# Patient Record
Sex: FEMALE
[De-identification: names, ages, dates, MRNs, and addresses within clinical notes are randomized per-mention and may not be internally consistent; named-entity substitution may affect disease eponyms.]

---

## 2019-04-05 PROBLEM — Z00.00 ENCOUNTER FOR PREVENTIVE HEALTH EXAMINATION: Status: ACTIVE | Noted: 2019-04-05

## 2019-04-09 ENCOUNTER — APPOINTMENT (OUTPATIENT)
Age: 44
End: 2019-04-09
Payer: COMMERCIAL

## 2019-04-09 DIAGNOSIS — C50.919 MALIGNANT NEOPLASM OF UNSPECIFIED SITE OF UNSPECIFIED FEMALE BREAST: ICD-10-CM

## 2019-04-09 PROCEDURE — 99204 OFFICE O/P NEW MOD 45 MIN: CPT

## 2019-04-09 PROCEDURE — 73502 X-RAY EXAM HIP UNI 2-3 VIEWS: CPT | Mod: LT

## 2019-04-18 NOTE — PHYSICAL EXAM
[FreeTextEntry1] : Physical exam reveals a fully alert oriented a patient complaining about severe left hip pain difficulty standing and walking and drainage of the left hip demonstrates limited range of motion over the left hip new x-ray and CAT scan demonstrate a destructive pathological fracture of the left acetabulum with fragmentation of the femoral head suggesting a possible status post metastatic breast with radiation-induced changes. At this stage and this condition was discussed with the patient the need for possible major surgical procedure including left total hip replacement was discussed with the patient at this stage patient would like to think about the underlying condition and we let us know about her decision with respect to treatment

## 2019-04-18 NOTE — HISTORY OF PRESENT ILLNESS
[FreeTextEntry1] : This is the first visit over 43 years old female with a previous history of  breast carcinoma presented with severe pain tenderness over the left hip related to metastatic carcinoma. 12 years ago patient was diagnosed with carcinoma or breast at that time underwent lumpectomy post radiation chemotherapy and several years later patient was found to have a metastatic lesion to the liter underwent a partial resection of the liter and the second time patient was found to have a progressive metastatic bone disease recently patient underwent radiation therapy to the left hip where x-rays demonstrated destructive lytic process involving the left acetabulum. At this stage patient having severe pain tenderness over the left hip difficulty standing and walking.

## 2019-05-13 ENCOUNTER — APPOINTMENT (OUTPATIENT)
Dept: LAB | Facility: HOSPITAL | Age: 44
End: 2019-05-13
Attending: ORTHOPAEDIC SURGERY
Payer: COMMERCIAL

## 2019-05-13 ENCOUNTER — OFFICE VISIT (OUTPATIENT)
Dept: PREADMISSION TESTING | Facility: HOSPITAL | Age: 44
End: 2019-05-13
Attending: ORTHOPAEDIC SURGERY
Payer: COMMERCIAL

## 2019-05-13 ENCOUNTER — TRANSCRIBE ORDERS (OUTPATIENT)
Dept: PREADMISSION TESTING | Facility: HOSPITAL | Age: 44
End: 2019-05-13

## 2019-05-13 VITALS
OXYGEN SATURATION: 97 % | HEART RATE: 82 BPM | HEIGHT: 65 IN | BODY MASS INDEX: 32.65 KG/M2 | TEMPERATURE: 98.1 F | DIASTOLIC BLOOD PRESSURE: 69 MMHG | SYSTOLIC BLOOD PRESSURE: 120 MMHG | RESPIRATION RATE: 16 BRPM | WEIGHT: 196 LBS

## 2019-05-13 DIAGNOSIS — M16.12 PRIMARY OSTEOARTHRITIS OF LEFT HIP: ICD-10-CM

## 2019-05-13 DIAGNOSIS — Z01.818 ENCOUNTER FOR PREADMISSION TESTING: Primary | ICD-10-CM

## 2019-05-13 DIAGNOSIS — C50.912 BREAST CANCER METASTASIZED TO BONE, LEFT (CMS/HCC): ICD-10-CM

## 2019-05-13 DIAGNOSIS — M16.12 PRIMARY OSTEOARTHRITIS OF LEFT HIP: Primary | ICD-10-CM

## 2019-05-13 DIAGNOSIS — C79.51 BREAST CANCER METASTASIZED TO BONE, LEFT (CMS/HCC): ICD-10-CM

## 2019-05-13 DIAGNOSIS — M16.12 PRIMARY LOCALIZED OSTEOARTHRITIS OF LEFT HIP: ICD-10-CM

## 2019-05-13 LAB
ABO + RH BLD: NORMAL
ANION GAP SERPL CALC-SCNC: 11 MEQ/L (ref 3–15)
ATRIAL RATE: 81
BLD GP AB SCN SERPL QL: NEGATIVE
BLOOD BANK CMNT PATIENT-IMP: NORMAL
BUN SERPL-MCNC: 8 MG/DL (ref 8–20)
CALCIUM SERPL-MCNC: 9.6 MG/DL (ref 8.9–10.3)
CHLORIDE SERPL-SCNC: 105 MEQ/L (ref 98–109)
CO2 SERPL-SCNC: 25 MEQ/L (ref 22–32)
CREAT SERPL-MCNC: 0.4 MG/DL
D AG BLD QL: NEGATIVE
ERYTHROCYTE [DISTWIDTH] IN BLOOD BY AUTOMATED COUNT: 18 % (ref 11.7–14.4)
GFR SERPL CREATININE-BSD FRML MDRD: >60 ML/MIN/1.73M*2
GLUCOSE SERPL-MCNC: 98 MG/DL (ref 70–99)
HCG SERPL-ACNC: 1.2 MIU/L
HCT VFR BLDCO AUTO: 35.2 %
HGB BLD-MCNC: 11 G/DL
LABORATORY COMMENT REPORT: NORMAL
MCH RBC QN AUTO: 24.6 PG (ref 28–33.2)
MCHC RBC AUTO-ENTMCNC: 31.3 G/DL (ref 32.2–35.5)
MCV RBC AUTO: 78.7 FL (ref 83–98)
P AXIS: 51
PDW BLD AUTO: 10.5 FL (ref 9.4–12.3)
PLATELET # BLD AUTO: 206 K/UL
POTASSIUM SERPL-SCNC: 3.7 MEQ/L (ref 3.6–5.1)
PR INTERVAL: 138
QRS DURATION: 84
QT INTERVAL: 392
QTC CALCULATION(BAZETT): 455
R AXIS: 31
RBC # BLD AUTO: 4.47 M/UL (ref 3.93–5.22)
SODIUM SERPL-SCNC: 141 MEQ/L (ref 136–144)
T WAVE AXIS: 22
VENTRICULAR RATE: 81
WBC # BLD AUTO: 7.97 K/UL

## 2019-05-13 PROCEDURE — 36415 COLL VENOUS BLD VENIPUNCTURE: CPT

## 2019-05-13 PROCEDURE — 84702 CHORIONIC GONADOTROPIN TEST: CPT

## 2019-05-13 PROCEDURE — 86850 RBC ANTIBODY SCREEN: CPT

## 2019-05-13 PROCEDURE — 93010 ELECTROCARDIOGRAM REPORT: CPT | Performed by: INTERNAL MEDICINE

## 2019-05-13 PROCEDURE — 99203 OFFICE O/P NEW LOW 30 MIN: CPT | Performed by: HOSPITALIST

## 2019-05-13 PROCEDURE — 85027 COMPLETE CBC AUTOMATED: CPT

## 2019-05-13 PROCEDURE — 80048 BASIC METABOLIC PNL TOTAL CA: CPT

## 2019-05-13 PROCEDURE — 93005 ELECTROCARDIOGRAM TRACING: CPT

## 2019-05-13 PROCEDURE — 86920 COMPATIBILITY TEST SPIN: CPT

## 2019-05-13 RX ORDER — IBUPROFEN 200 MG
200 TABLET ORAL EVERY 6 HOURS PRN
COMMUNITY

## 2019-05-13 RX ORDER — OXYCODONE HYDROCHLORIDE 5 MG/1
5 TABLET ORAL EVERY 8 HOURS PRN
Refills: 0 | Status: ON HOLD | COMMUNITY
Start: 2019-04-30 | End: 2019-05-31

## 2019-05-13 RX ORDER — ONDANSETRON HYDROCHLORIDE 8 MG/1
TABLET, FILM COATED ORAL
Refills: 5 | COMMUNITY
Start: 2019-05-07

## 2019-05-13 RX ORDER — GABAPENTIN 300 MG/1
CAPSULE ORAL
Refills: 0 | COMMUNITY
Start: 2019-05-07

## 2019-05-13 RX ORDER — EXEMESTANE 25 MG/1
TABLET ORAL
Refills: 5 | COMMUNITY
Start: 2019-05-02

## 2019-05-13 NOTE — ASSESSMENT & PLAN NOTE
Metastatic disease to the left bony pelvis with soft tissue extension, and left thigh. She has undergone XRT with Dr. Craven, last 11/18, and has had ongoing severe pain, a component of which is due to the left hip disease. She continues on treatment with monthly Lupron and Zometa, as well as Aromasin and Kadcyla, follows very closely with her oncologist, Dr. Cortez, and tells me he is aware of upcoming surgery. Naturally, I defer to him on all matters of her medical oncology care.

## 2019-05-13 NOTE — ASSESSMENT & PLAN NOTE
Patient with severe left hip pain stemming from end stage OA of the hip with marked collapse and bone loss of the left femoral head. She will proceed with hip replacement with Dr. Ugarte. She is currently taking oxycodone for pain which she may continue. She was advised to stop her motrin 1 week prior to surgery.

## 2019-05-13 NOTE — CONSULTS
[facility] Medicine Service -  Pre-Operative Consultation       Patient Name: Tatum Dhaliwal  Referring Surgeon:  Skip Ugarte MD   Reason for Referral: Pre-Operative Evaluation  Surgical Procedure: Left Total Hip Arthroplasty with Gap Ring and Constraint Cup  Operative Date: 5/28/19  Other Providers:      PCP: Manish Blount, No Pcp   Oncology: Ludwig Cortez MD   Radiation Oncology: Tessy Craven MD  PMR, Oncologic Rehab: Ham Sparks DO     HISTORY OF PRESENT ILLNESS      Tatum Dhaliwal is a 43 y.o. female presenting today to the [facility] at [facility] for pre-operative evaluation prior to planned surgery.  Patient with metastatic breast cancer reports severe progressive left hip pain. She notes 10/10 sharp stabbing pain in the left groin and focal low buttock. Pain is exacerbated by walking, standing, sitting for long periods, going up and down stairs, inclines, any pivoting movements, putting shoes/socks on, getting up out of low chair, and changing positions in bed. She tells me she is miserable with severe pain and finds few positions comfortable. She is taking oxycodone and motrin for pain.  Patient reported left hip and pelvis pain beginning 11/17 at which time the metastases in the left bony pelvis and soft tissue, and well as left thigh which was discovered. She has undergone RT with Dr. Craven, last 12/18. She has had several months of progressive pain prompting evaluation by several physicians, including MRI findings as described below, and it is thought that end stage hip OA is contributing to her pain. She sought the opinion of Dr. Ugarte and they are planning on proceeding with joint replacement.     Patient's Left Breast Cancer was initially diagnosed in 2007, DCIS with microscopic invasion. She underwent partial mastectomy and local RT, but had local recurrence in 2008, underwent mastectomy, reconstruction, RT, and took 1 year  of adjuvant tamoxifen which she stopped due to pregnancy. She then had local chest wall recurrence in 2013 and underwent RT and chemotherapy with taxotere/cytoxan x 4, weekly taxol, Herceptin and Pertuzumab every 3 weeks, monthly Lupron and Faslodex, and then started gemcitabine in '16. She was found to have isolated liver metastasis and underwent resection 1/17. She developed left pelvic pain in 11/17 and was subsequently found to have metastasis to the left inferior pelvic bone with soft tissue extension. She has had RT to this area, last 11/18 she tells me. She has continued on maintenance treatment, switching to Kadcyla 1/18 due to disease progression.      Patient denies any prior cardiac history. She has had several MUGA scans, and has been advised all were normal. She tells me she still ambulates around her home, up and down stairs, helps care for household and kids, without chest pain, chest pressure or shortness of breath. She denies history of pulmonary issues including prior pneumonia, asthma, wheezing. She denies prior knowledge of difficulty with kidneys/liver (other than resected met), and tells me she has never had difficulties with mentation, negative reactions to pain medications, surrounding surgeries. She denies history of DVT, VTE. She denies easy bruising/bleeding.     She does have persistent hypokalemia and tells me she has tried to increase the potassium in her diet.     She tells me she has iron deficiency and gets iron infusions.    The patient denies any current or recent chest pain or pressure, dyspnea, cough, sputum, fevers, chills, abdominal pain, nausea, vomiting, diarrhea or other symptoms.     Functionally, the patient is able to ascend a flight or so of stairs with no dyspnea or chest pain.     The patient denies, on specific questioning, the following:  No history of MI, arrhythmia,or CHF.  No history of NIKKO.  No history of DVT/PE.  No history of COPD.  No history of CVA.  No  history of DM.   No history of CKD.     PAST MEDICAL AND SURGICAL HISTORY      Past Medical History:   Diagnosis Date   • Anemia     has had iron infusions   • Breast cancer (CMS/HCC) (HCC) 2007    left breast xrt, 2008, 2017,2018/ chemo 2013, 2015, and currently   • Nausea     occas-zofran prn   • Neuropathy     hands and feet secondary to chemo       Past Surgical History:   Procedure Laterality Date   • BREAST LUMPECTOMY Left 2007   • LIVER RESECTION  2016    for mets?   • MASTECTOMY Left 2008   • PORTACATH PLACEMENT Right        MEDICATIONS        Current Outpatient Prescriptions:   •  ibuprofen (MOTRIN) 200 mg tablet, Take 200 mg by mouth every 6 (six) hours as needed for mild pain., Disp: , Rfl:   •  leuprolide acetate (LUPRON DEPOT IM), Inject into the shoulder, thigh, or buttocks every 30 (thirty) days., Disp: , Rfl:   •  exemestane (AROMASIN) 25 mg chemo tablet, TK 1 T PO D, Disp: , Rfl: 5  •  gabapentin (NEURONTIN) 300 mg capsule, TK 1 C PO BID, Disp: , Rfl: 0  •  ondansetron (ZOFRAN) 8 mg tablet, TK 1 T PO Q 8 H PRF NAUSEA, Disp: , Rfl: 5  •  oxyCODONE (ROXICODONE) 5 mg immediate release tablet, 5 mg every 8 (eight) hours as needed.  , Disp: , Rfl: 0  •  sodium chloride 0.9 % parenteral solution 250 mL with ado-trastuzumab 100 mg recon soln, Infuse into a venous catheter every 21 (twentyone) days.  , Disp: , Rfl:     ALLERGIES      Morphine and Amoxicillin    FAMILY HISTORY      family history is not on file.    Denies any prior known family history of DVTs/PEs/clotting disorder    SOCIAL HISTORY      Social History   Substance Use Topics   • Smoking status: Never Smoker   • Smokeless tobacco: Never Used   • Alcohol use No     Lives with  and children age 10-22.   2 story home, bathroom on both floors  No pets    REVIEW OF SYSTEMS      Constitutional: Denies Fever, Chills, Fatigue, Malaise, Unintentional Weight loss  HEENT: Denies Vision changes, Epistaxis, Trouble Swallowing, Sore  "Throat  Respiratory: Denies Cough, Shortness of Breath, Wheezing  Cardiovascular: Denies Chest Pain, Exertional Dyspnea, Palpitations, Edema  GI: Denies Abdominal pain, Nausea, Vomiting, Changes in bowel movements, Blood in stool   : Denies Dysuria, Hematuria  Musculoskeletal: Denies Back pain, Neck pain, +++ left anterior hip/groin and buttock pain.   Skin: Denies rash  Neuro: Denies Headache, Syncope  Heme: Denies Bleeding      PHYSICAL EXAMINATION      /69 (BP Location: Right upper arm, Patient Position: Sitting)   Pulse 82   Temp 36.7 °C (98.1 °F)   Resp 16   Ht 1.651 m (5' 5\")   Wt 88.9 kg (196 lb)   SpO2 97%   BMI 32.62 kg/m²   Body mass index is 32.62 kg/m².    Physical Exam   Constitutional: Well developed, Well Nourished, No apparent distress, Appears Comfortable  HEENT: NCAT, oropharynx clear  Neck: supple, no LAD  Cardiovascular: Normal rate, Regular Rhythm, Normal heart sounds, No murmur noted, No carotid bruits  Pulmonary: Normal effort without distress, Normal breath sounds without wheezing, rhonchi, or rales  Abdomen: Soft, no distension, nontender, normal bowel sounds  Extremities: No edema  Neurologic: No gross abnormalities, ambulating with cane/wheelchair for distances.   Skin: Warm, No rash  Psychiatric: Normal mood and affect      LABS / EKG        Labs  Lab Results   Component Value Date     05/13/2019    K 3.7 05/13/2019     05/13/2019    BUN 8 05/13/2019    CREATININE 0.4 (L) 05/13/2019    WBC 7.97 05/13/2019    HGB 11.0 (L) 05/13/2019    HCT 35.2 05/13/2019     05/13/2019   ;    ECG/Telemetry  Normal Sinus Rhythm, Nonspecific TW abnormalities.     MRI Pelvis 4/24/19   Findings compatible with treated metastasis of the left iliac bone/acetabular region with osseous destruction and comminuted and displaced pathologic fracture involving the posterior, superior and anterior acetabular regions with intraarticular extension as described. Bone tumor recurrence " cannot be excluded. End-stage left hip joint osteoarthritis with marked collapse and bone loss of the left femoral head. Moderate left hip joint effusion with synovitis. Edematous change enhancement of the musculature of the left hemipelvis most likely secondary to radiation therapy and pathologic fracture Increased signal of the left sciatic nerve most likely due to radiation therapy.    MUGA scan 4/17 noted EF 55%; I do see she had a MUGA 4/19/18, results not available to me, but patient reports being told it was normal.        ASSESSMENT AND PLAN         Preop examination  Medical management and peggy-operative risk commentary as noted below.      Primary osteoarthritis of left hip  Patient with severe left hip pain stemming from end stage OA of the hip with marked collapse and bone loss of the left femoral head. She will proceed with hip replacement with Dr. Ugarte. She is currently taking oxycodone for pain which she may continue. She was advised to stop her motrin 1 week prior to surgery.     Breast cancer metastasized to bone, left (CMS/HCC) (HCC)  Metastatic disease to the left bony pelvis with soft tissue extension, and left thigh. She has undergone XRT with Dr. Craven, last 11/18, and has had ongoing severe pain, a component of which is due to the left hip disease. She continues on treatment with monthly Lupron and Zometa, as well as Aromasin and Kadcyla, follows very closely with her oncologist, Dr. Cortez, and tells me he is aware of upcoming surgery. Naturally, I defer to him on all matters of her medical oncology care.        In regards to perioperative cardiac risk:  The patient denies any history of ischemic heart disease, denies any history of CHF, denies any history of CVA, is not on pre-operative treatment with insulin, and does not have a pre-operative creatinine > 2 mg/dL.   The Revised Cardiac Risk Index (RCRI) for this patient indicates 0.4% risk.     Further comments:  Resume supplements when  OK with surgical team.  I would encourage incentive spirometry to assist with minimizing peggy-operative pulmonary risk.  Patient is at increased risk for VTE and prophylaxis is recommended. Prophylaxis  choice and timing of such per the discretion of Dr. Ugarte.     Please do not hesitate to contact [facility] during the upcoming hospitalization with any questions or concerns.     Jesusita Valadez MD  5/13/2019

## 2019-05-13 NOTE — PRE-PROCEDURE INSTRUCTIONS
1. We will call you between 3 pm and 7 pm on May 24, 2019 to determine that arrival time for your procedure. If you do not hear by 4:30 PM. Please call [phone] for arrival time.    2. Please report to [address] [place] [address] [place] [address] [place], walk into [employer] and report to the admission desk on first floor on the day of your procedure.   3. Please follow the following fasting guidelines:   Nothing to eat or drink after midnight unless otherwise instructed by  your physician. No gum mints candy.Brush teeth/rinse Mouth   4. Early on the morning of the procedure please take your usual dose of the listed medications with a sip of water:    Oxycodone  Aromasin   5. Other Instructions: No aspirin aleve ibuprofen or advil-stop 5/21/19. Tylenol or Oxycodone OK   6. If you develop a cold, cough, fever, rash, or other symptom prior to the data of the procedure, please report it to your physician immediately.   7. If you need to cancel the procedure for any reason, please contact your physician or call the unit listed above.   8. Make arrangements to have someone drive you home from the procedure. If you have not arranged for transportation home, your surgery may be cancelled.    9. You may not take public transportation unless accompanied by a responsible person.   10. You may not drive a car or operate complex or potentially dangerous machinery for 24 hours following anesthesia and/or sedation.   11. If it is medically necessary for you to have a longer stay, you will be informed as soon as the decision is made.   12. Do not wear or bring anything of value to the hospital including jewelry of any kind. Do not wear make-up or contact lenses. DO bring your glasses and hearing aid.   13. No lotion, creams, powders, or oils on skin the morning of procedure    14. Dress in comfortable clothes.   15.  If instructed, please bring a copy of your Advanced Directive (Living Will/Durable Power of ) on the day of your procedure.      Pre  operative instructions given as per protocol.  Form explained by: SARAH Matthews     I have read and understand the above information. I have had sufficient opportunity to ask questions I might have and they have been answered to my satisfaction. I agree to comply with the Patient Responsibilities listed above and have received a copy of this form.

## 2019-05-21 PROBLEM — Z96.649 S/P TOTAL HIP ARTHROPLASTY: Status: ACTIVE | Noted: 2019-05-21

## 2019-05-21 RX ORDER — DEXTROSE 40 %
15-30 GEL (GRAM) ORAL AS NEEDED
Status: CANCELLED | OUTPATIENT
Start: 2019-05-21

## 2019-05-21 RX ORDER — DEXTROSE 50 % IN WATER (D50W) INTRAVENOUS SYRINGE
25 AS NEEDED
Status: CANCELLED | OUTPATIENT
Start: 2019-05-21

## 2019-05-21 RX ORDER — IBUPROFEN 200 MG
16-32 TABLET ORAL AS NEEDED
Status: CANCELLED | OUTPATIENT
Start: 2019-05-21

## 2019-05-28 ENCOUNTER — HOSPITAL ENCOUNTER (INPATIENT)
Facility: HOSPITAL | Age: 44
LOS: 4 days | Discharge: HOME HEALTH | DRG: 470 | End: 2019-06-01
Attending: ORTHOPAEDIC SURGERY | Admitting: ORTHOPAEDIC SURGERY
Payer: COMMERCIAL

## 2019-05-28 ENCOUNTER — ANESTHESIA (OUTPATIENT)
Dept: OPERATING ROOM | Facility: HOSPITAL | Age: 44
Setting detail: SURGERY ADMIT
DRG: 470 | End: 2019-05-28
Payer: COMMERCIAL

## 2019-05-28 ENCOUNTER — APPOINTMENT (OUTPATIENT)
Dept: RADIOLOGY | Facility: HOSPITAL | Age: 44
Setting detail: SURGERY ADMIT
DRG: 470 | End: 2019-05-28
Attending: PHYSICIAN ASSISTANT
Payer: COMMERCIAL

## 2019-05-28 DIAGNOSIS — M16.12 ARTHRITIS OF LEFT HIP: ICD-10-CM

## 2019-05-28 DIAGNOSIS — M16.12 PRIMARY OSTEOARTHRITIS OF LEFT HIP: Primary | ICD-10-CM

## 2019-05-28 PROBLEM — D72.823 LEUKEMOID REACTION: Status: ACTIVE | Noted: 2019-05-28

## 2019-05-28 PROBLEM — E66.09 CLASS 1 OBESITY DUE TO EXCESS CALORIES WITHOUT SERIOUS COMORBIDITY WITH BODY MASS INDEX (BMI) OF 32.0 TO 32.9 IN ADULT: Status: ACTIVE | Noted: 2019-05-28

## 2019-05-28 PROBLEM — D50.9 IRON DEFICIENCY ANEMIA: Status: ACTIVE | Noted: 2019-05-28

## 2019-05-28 PROBLEM — E66.811 CLASS 1 OBESITY DUE TO EXCESS CALORIES WITHOUT SERIOUS COMORBIDITY WITH BODY MASS INDEX (BMI) OF 32.0 TO 32.9 IN ADULT: Status: ACTIVE | Noted: 2019-05-28

## 2019-05-28 LAB
ABO + RH BLD: NORMAL
B-HCG UR QL: NEGATIVE
D AG BLD QL: NEGATIVE
ERYTHROCYTE [DISTWIDTH] IN BLOOD BY AUTOMATED COUNT: 17 % (ref 11.7–14.4)
HCT VFR BLDCO AUTO: 35.4 %
HGB BLD-MCNC: 11.1 G/DL
LABORATORY COMMENT REPORT: NORMAL
MCH RBC QN AUTO: 25.6 PG (ref 28–33.2)
MCHC RBC AUTO-ENTMCNC: 31.4 G/DL (ref 32.2–35.5)
MCV RBC AUTO: 81.8 FL (ref 83–98)
PDW BLD AUTO: 10.5 FL (ref 9.4–12.3)
PLATELET # BLD AUTO: 226 K/UL
POCT TEST: NORMAL
RBC # BLD AUTO: 4.33 M/UL (ref 3.93–5.22)
WBC # BLD AUTO: 15.57 K/UL

## 2019-05-28 PROCEDURE — 25000000 HC PHARMACY GENERAL: Performed by: NURSE ANESTHETIST, CERTIFIED REGISTERED

## 2019-05-28 PROCEDURE — C1776 JOINT DEVICE (IMPLANTABLE): HCPCS | Performed by: ORTHOPAEDIC SURGERY

## 2019-05-28 PROCEDURE — 37000001 HC ANESTHESIA GENERAL: Performed by: ORTHOPAEDIC SURGERY

## 2019-05-28 PROCEDURE — 36415 COLL VENOUS BLD VENIPUNCTURE: CPT | Performed by: ORTHOPAEDIC SURGERY

## 2019-05-28 PROCEDURE — P9016 RBC LEUKOCYTES REDUCED: HCPCS

## 2019-05-28 PROCEDURE — 25000000 HC PHARMACY GENERAL: Performed by: PHYSICIAN ASSISTANT

## 2019-05-28 PROCEDURE — 63600000 HC DRUGS/DETAIL CODE: Performed by: PHYSICIAN ASSISTANT

## 2019-05-28 PROCEDURE — 12000000 HC ROOM AND CARE MED/SURG

## 2019-05-28 PROCEDURE — 85025 COMPLETE CBC W/AUTO DIFF WBC: CPT | Performed by: ORTHOPAEDIC SURGERY

## 2019-05-28 PROCEDURE — 25800000 HC PHARMACY IV SOLUTIONS: Performed by: PHYSICIAN ASSISTANT

## 2019-05-28 PROCEDURE — 27800000 HC SUPPLY/IMPLANTS: Performed by: ORTHOPAEDIC SURGERY

## 2019-05-28 PROCEDURE — 63600000 HC DRUGS/DETAIL CODE: Performed by: NURSE ANESTHETIST, CERTIFIED REGISTERED

## 2019-05-28 PROCEDURE — 73501 X-RAY EXAM HIP UNI 1 VIEW: CPT | Mod: LT

## 2019-05-28 PROCEDURE — 88305 TISSUE EXAM BY PATHOLOGIST: CPT | Performed by: ORTHOPAEDIC SURGERY

## 2019-05-28 PROCEDURE — 0SRB0J9 REPLACEMENT OF LEFT HIP JOINT WITH SYNTHETIC SUBSTITUTE, CEMENTED, OPEN APPROACH: ICD-10-PCS | Performed by: ORTHOPAEDIC SURGERY

## 2019-05-28 PROCEDURE — 71000001 HC PACU PHASE 1 INITIAL 30MIN: Performed by: ORTHOPAEDIC SURGERY

## 2019-05-28 PROCEDURE — C1713 ANCHOR/SCREW BN/BN,TIS/BN: HCPCS | Performed by: ORTHOPAEDIC SURGERY

## 2019-05-28 PROCEDURE — 36000015 HC OR LEVEL 5 EA ADDL MIN: Performed by: ORTHOPAEDIC SURGERY

## 2019-05-28 PROCEDURE — 27200000 HC STERILE SUPPLY: Performed by: ORTHOPAEDIC SURGERY

## 2019-05-28 PROCEDURE — 99233 SBSQ HOSP IP/OBS HIGH 50: CPT | Performed by: HOSPITALIST

## 2019-05-28 PROCEDURE — 71000011 HC PACU PHASE 1 EA ADDL MIN: Performed by: ORTHOPAEDIC SURGERY

## 2019-05-28 PROCEDURE — 25800000 HC PHARMACY IV SOLUTIONS: Performed by: NURSE ANESTHETIST, CERTIFIED REGISTERED

## 2019-05-28 PROCEDURE — 36430 TRANSFUSION BLD/BLD COMPNT: CPT | Performed by: ANESTHESIOLOGY

## 2019-05-28 PROCEDURE — 25800000 HC PHARMACY IV SOLUTIONS: Performed by: ORTHOPAEDIC SURGERY

## 2019-05-28 PROCEDURE — 63700000 HC SELF-ADMINISTRABLE DRUG: Performed by: PHYSICIAN ASSISTANT

## 2019-05-28 PROCEDURE — 0QS304Z REPOSITION LEFT PELVIC BONE WITH INTERNAL FIXATION DEVICE, OPEN APPROACH: ICD-10-PCS | Performed by: ORTHOPAEDIC SURGERY

## 2019-05-28 PROCEDURE — 63600000 HC DRUGS/DETAIL CODE: Performed by: ANESTHESIOLOGY

## 2019-05-28 PROCEDURE — 36000005 HC OR LEVEL 5 INITIAL 30MIN: Performed by: ORTHOPAEDIC SURGERY

## 2019-05-28 DEVICE — IMPLANTABLE DEVICE: Type: IMPLANTABLE DEVICE | Site: HIP | Status: FUNCTIONAL

## 2019-05-28 DEVICE — CONSTRAINED ACETABULAR INSERT
Type: IMPLANTABLE DEVICE | Site: HIP | Status: FUNCTIONAL
Brand: TRIDENT

## 2019-05-28 DEVICE — ACETABULAR SHELL
Type: IMPLANTABLE DEVICE | Site: HIP | Status: FUNCTIONAL
Brand: RESTORATION

## 2019-05-28 DEVICE — STIMULAN® RAPID CURE PROVIDED STERILE FOR SINGLE PATIENT USE. STIMULAN® RAPID CURE CONTAINS CALCIUM SULFATE POWDER AND MIXING SOLUTION IN PRE-MEASURED QUANTITIES SO THAT WHEN MIXED TOGETHER IN A STERILE MIXING BOWL, THE RESULTANT PASTE IS TO BE DIGITALLY PACKED INTO OPEN BONE VOID/GAP TO SET INSITU OR PLACED INTO THE MOULD PROVIDED, THE MIXTURE SETS TO FORM BEADS. THE BIODEGRADABLE, RADIOPAQUE BEADS ARE RESORBED IN APPROXIMATELY 30 – 60 DAYS WHEN USED IN ACCORDANCE WITH THE DEVICE LABELLING. STIMULAN® RAPID CURE IS MANUFACTURED FROM SYNTHETIC IMPLANT GRADE CALCIUM SULFATE DIHYDRATE(CASO4.2H2O) THAT RESORBS AND IS REPLACED WITH BONE DURING THE HEALING PROCESS. ALSO, AS THE BONE VOID FILLER BEADS ARE BIODEGRADABLE AND BIOCOMPATIBLE, THEY MAY BE USED AT AN INFECTED SITE.
Type: IMPLANTABLE DEVICE | Site: HIP | Status: FUNCTIONAL
Brand: STIMULAN® RAPID CURE

## 2019-05-28 DEVICE — LOW FRICTION ION TREATMENT
Type: IMPLANTABLE DEVICE | Site: HIP | Status: FUNCTIONAL
Brand: C-TAPER HEAD

## 2019-05-28 DEVICE — 127 DEGREE CEMENTED HIP STEM
Type: IMPLANTABLE DEVICE | Site: HIP | Status: FUNCTIONAL
Brand: OMNIFIT

## 2019-05-28 DEVICE — TOBRA FULL DOSE ANTIBIOTIC BONE CEMENT, 10 PACK CATALOG NUMBER IS 6197-9-010
Type: IMPLANTABLE DEVICE | Site: HIP | Status: FUNCTIONAL
Brand: SIMPLEX

## 2019-05-28 RX ORDER — FERROUS SULFATE 325(65) MG
325 TABLET ORAL
Status: DISCONTINUED | OUTPATIENT
Start: 2019-05-29 | End: 2019-06-01 | Stop reason: HOSPADM

## 2019-05-28 RX ORDER — DEXTROSE 50 % IN WATER (D50W) INTRAVENOUS SYRINGE
25 AS NEEDED
Status: DISCONTINUED | OUTPATIENT
Start: 2019-05-28 | End: 2019-06-01 | Stop reason: HOSPADM

## 2019-05-28 RX ORDER — DEXTROSE 40 %
15-30 GEL (GRAM) ORAL AS NEEDED
Status: DISCONTINUED | OUTPATIENT
Start: 2019-05-28 | End: 2019-06-01 | Stop reason: HOSPADM

## 2019-05-28 RX ORDER — ROCURONIUM BROMIDE 10 MG/ML
INJECTION, SOLUTION INTRAVENOUS AS NEEDED
Status: DISCONTINUED | OUTPATIENT
Start: 2019-05-28 | End: 2019-05-28 | Stop reason: SURG

## 2019-05-28 RX ORDER — ONDANSETRON 4 MG/1
4 TABLET, ORALLY DISINTEGRATING ORAL
Status: DISCONTINUED | OUTPATIENT
Start: 2019-05-29 | End: 2019-06-01 | Stop reason: HOSPADM

## 2019-05-28 RX ORDER — PHENYLEPHRINE HYDROCHLORIDE 10 MG/ML
INJECTION INTRAVENOUS AS NEEDED
Status: DISCONTINUED | OUTPATIENT
Start: 2019-05-28 | End: 2019-05-28 | Stop reason: SURG

## 2019-05-28 RX ORDER — PROPOFOL 10 MG/ML
INJECTION, EMULSION INTRAVENOUS AS NEEDED
Status: DISCONTINUED | OUTPATIENT
Start: 2019-05-28 | End: 2019-05-28 | Stop reason: SURG

## 2019-05-28 RX ORDER — IBUPROFEN 200 MG
16-32 TABLET ORAL AS NEEDED
Status: DISCONTINUED | OUTPATIENT
Start: 2019-05-28 | End: 2019-05-28

## 2019-05-28 RX ORDER — BISACODYL 10 MG/1
10 SUPPOSITORY RECTAL DAILY PRN
Status: DISCONTINUED | OUTPATIENT
Start: 2019-05-28 | End: 2019-06-01 | Stop reason: HOSPADM

## 2019-05-28 RX ORDER — ACETAMINOPHEN 325 MG/1
975 TABLET ORAL ONCE
Status: COMPLETED | OUTPATIENT
Start: 2019-05-28 | End: 2019-05-28

## 2019-05-28 RX ORDER — DEXAMETHASONE SODIUM PHOSPHATE 4 MG/ML
INJECTION, SOLUTION INTRA-ARTICULAR; INTRALESIONAL; INTRAMUSCULAR; INTRAVENOUS; SOFT TISSUE AS NEEDED
Status: DISCONTINUED | OUTPATIENT
Start: 2019-05-28 | End: 2019-05-28 | Stop reason: SURG

## 2019-05-28 RX ORDER — VANCOMYCIN HYDROCHLORIDE
1250 ONCE
Status: COMPLETED | OUTPATIENT
Start: 2019-05-28 | End: 2019-05-28

## 2019-05-28 RX ORDER — SODIUM CHLORIDE 9 MG/ML
INJECTION, SOLUTION INTRAVENOUS ONCE
Status: COMPLETED | OUTPATIENT
Start: 2019-05-28 | End: 2019-05-28

## 2019-05-28 RX ORDER — ASPIRIN 325 MG
325 TABLET ORAL 2 TIMES DAILY
Status: DISCONTINUED | OUTPATIENT
Start: 2019-05-28 | End: 2019-05-31

## 2019-05-28 RX ORDER — LIDOCAINE HYDROCHLORIDE 10 MG/ML
INJECTION, SOLUTION INFILTRATION; PERINEURAL AS NEEDED
Status: DISCONTINUED | OUTPATIENT
Start: 2019-05-28 | End: 2019-05-28 | Stop reason: SURG

## 2019-05-28 RX ORDER — DEXTROSE 40 %
15-30 GEL (GRAM) ORAL AS NEEDED
Status: DISCONTINUED | OUTPATIENT
Start: 2019-05-28 | End: 2019-05-28

## 2019-05-28 RX ORDER — TRAMADOL HYDROCHLORIDE 50 MG/1
50 TABLET ORAL EVERY 6 HOURS PRN
Status: DISCONTINUED | OUTPATIENT
Start: 2019-05-28 | End: 2019-06-01 | Stop reason: HOSPADM

## 2019-05-28 RX ORDER — CELECOXIB 200 MG/1
200 CAPSULE ORAL ONCE
Status: COMPLETED | OUTPATIENT
Start: 2019-05-28 | End: 2019-05-28

## 2019-05-28 RX ORDER — SENNOSIDES 8.6 MG/1
1 TABLET ORAL 2 TIMES DAILY PRN
Status: DISCONTINUED | OUTPATIENT
Start: 2019-05-28 | End: 2019-06-01 | Stop reason: HOSPADM

## 2019-05-28 RX ORDER — DIPHENHYDRAMINE HCL 50 MG/ML
25 VIAL (ML) INJECTION EVERY 6 HOURS PRN
Status: DISCONTINUED | OUTPATIENT
Start: 2019-05-28 | End: 2019-06-01 | Stop reason: HOSPADM

## 2019-05-28 RX ORDER — CEFAZOLIN SODIUM 2 G/50ML
2 SOLUTION INTRAVENOUS
Status: COMPLETED | OUTPATIENT
Start: 2019-05-28 | End: 2019-05-29

## 2019-05-28 RX ORDER — HYDROMORPHONE HYDROCHLORIDE 1 MG/ML
0.5 INJECTION, SOLUTION INTRAMUSCULAR; INTRAVENOUS; SUBCUTANEOUS
Status: DISCONTINUED | OUTPATIENT
Start: 2019-05-28 | End: 2019-06-01 | Stop reason: HOSPADM

## 2019-05-28 RX ORDER — OXYCODONE HYDROCHLORIDE 5 MG/1
5 TABLET ORAL
Status: DISCONTINUED | OUTPATIENT
Start: 2019-05-28 | End: 2019-06-01 | Stop reason: HOSPADM

## 2019-05-28 RX ORDER — DEXTROSE 50 % IN WATER (D50W) INTRAVENOUS SYRINGE
25 AS NEEDED
Status: DISCONTINUED | OUTPATIENT
Start: 2019-05-28 | End: 2019-05-28

## 2019-05-28 RX ORDER — ACETAMINOPHEN 325 MG/1
975 TABLET ORAL
Status: DISCONTINUED | OUTPATIENT
Start: 2019-05-28 | End: 2019-06-01 | Stop reason: HOSPADM

## 2019-05-28 RX ORDER — PANTOPRAZOLE SODIUM 40 MG/1
40 TABLET, DELAYED RELEASE ORAL
Status: DISCONTINUED | OUTPATIENT
Start: 2019-05-29 | End: 2019-06-01 | Stop reason: HOSPADM

## 2019-05-28 RX ORDER — EXEMESTANE 25 MG/1
25 TABLET ORAL DAILY
Status: DISCONTINUED | OUTPATIENT
Start: 2019-05-29 | End: 2019-06-01 | Stop reason: HOSPADM

## 2019-05-28 RX ORDER — FENTANYL CITRATE 50 UG/ML
INJECTION, SOLUTION INTRAMUSCULAR; INTRAVENOUS AS NEEDED
Status: DISCONTINUED | OUTPATIENT
Start: 2019-05-28 | End: 2019-05-28 | Stop reason: SURG

## 2019-05-28 RX ORDER — ALUMINUM HYDROXIDE, MAGNESIUM HYDROXIDE, AND SIMETHICONE 1200; 120; 1200 MG/30ML; MG/30ML; MG/30ML
30 SUSPENSION ORAL EVERY 4 HOURS PRN
Status: DISCONTINUED | OUTPATIENT
Start: 2019-05-28 | End: 2019-06-01 | Stop reason: HOSPADM

## 2019-05-28 RX ORDER — MIDAZOLAM HYDROCHLORIDE 2 MG/2ML
INJECTION, SOLUTION INTRAMUSCULAR; INTRAVENOUS AS NEEDED
Status: DISCONTINUED | OUTPATIENT
Start: 2019-05-28 | End: 2019-05-28 | Stop reason: SURG

## 2019-05-28 RX ORDER — DIPHENHYDRAMINE HCL 25 MG
25 CAPSULE ORAL EVERY 6 HOURS PRN
Status: DISCONTINUED | OUTPATIENT
Start: 2019-05-28 | End: 2019-06-01 | Stop reason: HOSPADM

## 2019-05-28 RX ORDER — KETOROLAC TROMETHAMINE 15 MG/ML
7.5 INJECTION, SOLUTION INTRAMUSCULAR; INTRAVENOUS EVERY 6 HOURS
Status: COMPLETED | OUTPATIENT
Start: 2019-05-28 | End: 2019-05-30

## 2019-05-28 RX ORDER — ONDANSETRON HYDROCHLORIDE 2 MG/ML
4 INJECTION, SOLUTION INTRAVENOUS
Status: DISCONTINUED | OUTPATIENT
Start: 2019-05-28 | End: 2019-05-28 | Stop reason: HOSPADM

## 2019-05-28 RX ORDER — ONDANSETRON HYDROCHLORIDE 2 MG/ML
INJECTION, SOLUTION INTRAVENOUS AS NEEDED
Status: DISCONTINUED | OUTPATIENT
Start: 2019-05-28 | End: 2019-05-28 | Stop reason: SURG

## 2019-05-28 RX ORDER — GABAPENTIN 300 MG/1
300 CAPSULE ORAL 2 TIMES DAILY
Status: DISCONTINUED | OUTPATIENT
Start: 2019-05-28 | End: 2019-06-01 | Stop reason: HOSPADM

## 2019-05-28 RX ORDER — NEOSTIGMINE METHYLSULFATE 1 MG/ML
INJECTION INTRAVENOUS AS NEEDED
Status: DISCONTINUED | OUTPATIENT
Start: 2019-05-28 | End: 2019-05-28 | Stop reason: SURG

## 2019-05-28 RX ORDER — GLYCOPYRROLATE 0.6MG/3ML
SYRINGE (ML) INTRAVENOUS AS NEEDED
Status: DISCONTINUED | OUTPATIENT
Start: 2019-05-28 | End: 2019-05-28 | Stop reason: SURG

## 2019-05-28 RX ORDER — OXYCODONE HYDROCHLORIDE 5 MG/1
10 TABLET ORAL
Status: DISCONTINUED | OUTPATIENT
Start: 2019-05-28 | End: 2019-06-01 | Stop reason: HOSPADM

## 2019-05-28 RX ORDER — POLYETHYLENE GLYCOL 3350 17 G/17G
17 POWDER, FOR SOLUTION ORAL DAILY PRN
Status: DISCONTINUED | OUTPATIENT
Start: 2019-05-28 | End: 2019-06-01 | Stop reason: HOSPADM

## 2019-05-28 RX ORDER — SODIUM CHLORIDE, SODIUM GLUCONATE, SODIUM ACETATE, POTASSIUM CHLORIDE AND MAGNESIUM CHLORIDE 30; 37; 368; 526; 502 MG/100ML; MG/100ML; MG/100ML; MG/100ML; MG/100ML
INJECTION, SOLUTION INTRAVENOUS CONTINUOUS PRN
Status: DISCONTINUED | OUTPATIENT
Start: 2019-05-28 | End: 2019-05-28 | Stop reason: SURG

## 2019-05-28 RX ORDER — POLYETHYLENE GLYCOL 3350 17 G/17G
17 POWDER, FOR SOLUTION ORAL DAILY
Status: DISCONTINUED | OUTPATIENT
Start: 2019-05-28 | End: 2019-06-01 | Stop reason: HOSPADM

## 2019-05-28 RX ORDER — FENTANYL CITRATE 50 UG/ML
50 INJECTION, SOLUTION INTRAMUSCULAR; INTRAVENOUS
Status: DISCONTINUED | OUTPATIENT
Start: 2019-05-28 | End: 2019-05-28 | Stop reason: HOSPADM

## 2019-05-28 RX ORDER — ONDANSETRON HYDROCHLORIDE 2 MG/ML
4 INJECTION, SOLUTION INTRAVENOUS EVERY 8 HOURS PRN
Status: DISCONTINUED | OUTPATIENT
Start: 2019-05-28 | End: 2019-06-01 | Stop reason: HOSPADM

## 2019-05-28 RX ORDER — HYDROMORPHONE HYDROCHLORIDE 2 MG/ML
0.5 INJECTION, SOLUTION INTRAMUSCULAR; INTRAVENOUS; SUBCUTANEOUS
Status: DISCONTINUED | OUTPATIENT
Start: 2019-05-28 | End: 2019-05-28 | Stop reason: HOSPADM

## 2019-05-28 RX ORDER — AMOXICILLIN 250 MG
1 CAPSULE ORAL 2 TIMES DAILY
Status: DISCONTINUED | OUTPATIENT
Start: 2019-05-28 | End: 2019-06-01 | Stop reason: HOSPADM

## 2019-05-28 RX ORDER — IBUPROFEN 200 MG
16-32 TABLET ORAL AS NEEDED
Status: DISCONTINUED | OUTPATIENT
Start: 2019-05-28 | End: 2019-06-01 | Stop reason: HOSPADM

## 2019-05-28 RX ORDER — GLYCOPYRROLATE 0.6MG/3ML
SYRINGE (ML) INTRAVENOUS AS NEEDED
Status: DISCONTINUED | OUTPATIENT
Start: 2019-05-28 | End: 2019-05-28

## 2019-05-28 RX ORDER — CEFAZOLIN SODIUM 2 G/50ML
2 SOLUTION INTRAVENOUS
Status: COMPLETED | OUTPATIENT
Start: 2019-05-28 | End: 2019-05-28

## 2019-05-28 RX ORDER — CELECOXIB 200 MG/1
200 CAPSULE ORAL DAILY
Status: DISCONTINUED | OUTPATIENT
Start: 2019-05-29 | End: 2019-06-01 | Stop reason: HOSPADM

## 2019-05-28 RX ORDER — METOPROLOL TARTRATE 1 MG/ML
INJECTION, SOLUTION INTRAVENOUS AS NEEDED
Status: DISCONTINUED | OUTPATIENT
Start: 2019-05-28 | End: 2019-05-28 | Stop reason: SURG

## 2019-05-28 RX ORDER — ONDANSETRON 4 MG/1
4 TABLET, ORALLY DISINTEGRATING ORAL EVERY 8 HOURS PRN
Status: DISCONTINUED | OUTPATIENT
Start: 2019-05-28 | End: 2019-06-01 | Stop reason: HOSPADM

## 2019-05-28 RX ORDER — DEXTROSE MONOHYDRATE, SODIUM CHLORIDE, AND POTASSIUM CHLORIDE 50; 1.49; 9 G/1000ML; G/1000ML; G/1000ML
125 INJECTION, SOLUTION INTRAVENOUS CONTINUOUS
Status: ACTIVE | OUTPATIENT
Start: 2019-05-28 | End: 2019-05-29

## 2019-05-28 RX ADMIN — KETOROLAC TROMETHAMINE 7.5 MG: 15 INJECTION, SOLUTION INTRAMUSCULAR; INTRAVENOUS at 19:13

## 2019-05-28 RX ADMIN — Medication 3 MG: at 13:57

## 2019-05-28 RX ADMIN — PHENYLEPHRINE HYDROCHLORIDE 100 MCG: 10 INJECTION INTRAVENOUS at 12:35

## 2019-05-28 RX ADMIN — FENTANYL CITRATE 50 MCG: 50 INJECTION, SOLUTION INTRAMUSCULAR; INTRAVENOUS at 11:39

## 2019-05-28 RX ADMIN — PHENYLEPHRINE HYDROCHLORIDE 100 MCG: 10 INJECTION INTRAVENOUS at 14:02

## 2019-05-28 RX ADMIN — MIDAZOLAM HYDROCHLORIDE 2 MG: 1 INJECTION, SOLUTION INTRAMUSCULAR; INTRAVENOUS at 11:01

## 2019-05-28 RX ADMIN — ROCURONIUM BROMIDE 10 MG: 10 INJECTION, SOLUTION INTRAVENOUS at 13:19

## 2019-05-28 RX ADMIN — PHENYLEPHRINE HYDROCHLORIDE 100 MCG: 10 INJECTION INTRAVENOUS at 13:12

## 2019-05-28 RX ADMIN — METOPROLOL TARTRATE 1 MG: 5 INJECTION, SOLUTION INTRAVENOUS at 11:57

## 2019-05-28 RX ADMIN — SODIUM CHLORIDE, SODIUM GLUCONATE, SODIUM ACETATE, POTASSIUM CHLORIDE AND MAGNESIUM CHLORIDE: 526; 502; 368; 37; 30 INJECTION, SOLUTION INTRAVENOUS at 11:18

## 2019-05-28 RX ADMIN — VANCOMYCIN HYDROCHLORIDE 1250 MG: 1 INJECTION, POWDER, LYOPHILIZED, FOR SOLUTION INTRAVENOUS at 10:06

## 2019-05-28 RX ADMIN — FENTANYL CITRATE 50 MCG: 50 INJECTION, SOLUTION INTRAMUSCULAR; INTRAVENOUS at 15:13

## 2019-05-28 RX ADMIN — TRANEXAMIC ACID 1800 MG: 100 INJECTION, SOLUTION INTRAVENOUS at 11:10

## 2019-05-28 RX ADMIN — DEXAMETHASONE SODIUM PHOSPHATE 4 MG: 4 INJECTION, SOLUTION INTRAMUSCULAR; INTRAVENOUS at 11:31

## 2019-05-28 RX ADMIN — LIDOCAINE HYDROCHLORIDE 5 ML: 10 INJECTION, SOLUTION INFILTRATION; PERINEURAL at 11:10

## 2019-05-28 RX ADMIN — PHENYLEPHRINE HYDROCHLORIDE 100 MCG: 10 INJECTION INTRAVENOUS at 12:55

## 2019-05-28 RX ADMIN — ROCURONIUM BROMIDE 50 MG: 10 INJECTION, SOLUTION INTRAVENOUS at 11:11

## 2019-05-28 RX ADMIN — CEFAZOLIN SODIUM 2 G: 2 SOLUTION INTRAVENOUS at 19:10

## 2019-05-28 RX ADMIN — OXYCODONE HYDROCHLORIDE 10 MG: 5 TABLET ORAL at 23:35

## 2019-05-28 RX ADMIN — ONDANSETRON 4 MG: 2 INJECTION INTRAMUSCULAR; INTRAVENOUS at 13:41

## 2019-05-28 RX ADMIN — GLYCOPYRROLATE 0.4 MG: 0.2 INJECTION INTRAMUSCULAR; INTRAVENOUS at 13:57

## 2019-05-28 RX ADMIN — GABAPENTIN 300 MG: 300 CAPSULE ORAL at 19:12

## 2019-05-28 RX ADMIN — PROPOFOL 150 MG: 10 INJECTION, EMULSION INTRAVENOUS at 11:10

## 2019-05-28 RX ADMIN — ASPIRIN 325 MG: 325 TABLET ORAL at 20:50

## 2019-05-28 RX ADMIN — FENTANYL CITRATE 100 MCG: 50 INJECTION, SOLUTION INTRAMUSCULAR; INTRAVENOUS at 11:55

## 2019-05-28 RX ADMIN — FENTANYL CITRATE 50 MCG: 50 INJECTION, SOLUTION INTRAMUSCULAR; INTRAVENOUS at 14:51

## 2019-05-28 RX ADMIN — CEFAZOLIN 2 G: 10 INJECTION, POWDER, FOR SOLUTION INTRAVENOUS at 11:05

## 2019-05-28 RX ADMIN — OXYCODONE HYDROCHLORIDE 10 MG: 5 TABLET ORAL at 15:49

## 2019-05-28 RX ADMIN — FENTANYL CITRATE 50 MCG: 50 INJECTION, SOLUTION INTRAMUSCULAR; INTRAVENOUS at 13:59

## 2019-05-28 RX ADMIN — MULTIPLE VITAMINS W/ MINERALS TAB 1 TABLET: TAB at 19:12

## 2019-05-28 RX ADMIN — CELECOXIB 200 MG: 200 CAPSULE ORAL at 10:03

## 2019-05-28 RX ADMIN — SODIUM CHLORIDE: 9 INJECTION, SOLUTION INTRAVENOUS at 11:01

## 2019-05-28 RX ADMIN — FENTANYL CITRATE 25 MCG: 50 INJECTION, SOLUTION INTRAMUSCULAR; INTRAVENOUS at 11:04

## 2019-05-28 RX ADMIN — PHENYLEPHRINE HYDROCHLORIDE 100 MCG: 10 INJECTION INTRAVENOUS at 12:42

## 2019-05-28 RX ADMIN — ACETAMINOPHEN 325 MG: 325 TABLET ORAL at 23:35

## 2019-05-28 RX ADMIN — ROCURONIUM BROMIDE 20 MG: 10 INJECTION, SOLUTION INTRAVENOUS at 12:22

## 2019-05-28 RX ADMIN — PHENYLEPHRINE HYDROCHLORIDE 100 MCG: 10 INJECTION INTRAVENOUS at 12:33

## 2019-05-28 RX ADMIN — POTASSIUM CHLORIDE, DEXTROSE MONOHYDRATE AND SODIUM CHLORIDE 125 ML/HR: 150; 5; 900 INJECTION, SOLUTION INTRAVENOUS at 19:24

## 2019-05-28 RX ADMIN — PHENYLEPHRINE HYDROCHLORIDE 100 MCG: 10 INJECTION INTRAVENOUS at 13:00

## 2019-05-28 RX ADMIN — OXYCODONE HYDROCHLORIDE 10 MG: 5 TABLET ORAL at 19:08

## 2019-05-28 RX ADMIN — ACETAMINOPHEN 325 MG: 325 TABLET ORAL at 19:09

## 2019-05-28 RX ADMIN — FENTANYL CITRATE 75 MCG: 50 INJECTION, SOLUTION INTRAMUSCULAR; INTRAVENOUS at 11:10

## 2019-05-28 RX ADMIN — ACETAMINOPHEN 325 MG: 325 TABLET ORAL at 16:31

## 2019-05-28 RX ADMIN — FENTANYL CITRATE 50 MCG: 50 INJECTION, SOLUTION INTRAMUSCULAR; INTRAVENOUS at 13:56

## 2019-05-28 RX ADMIN — METOPROLOL TARTRATE 1 MG: 5 INJECTION, SOLUTION INTRAVENOUS at 11:59

## 2019-05-28 RX ADMIN — ACETAMINOPHEN 975 MG: 325 TABLET ORAL at 10:02

## 2019-05-28 RX ADMIN — PHENYLEPHRINE HYDROCHLORIDE 50 MCG: 10 INJECTION INTRAVENOUS at 12:31

## 2019-05-28 RX ADMIN — METOPROLOL TARTRATE 0.5 MG: 5 INJECTION, SOLUTION INTRAVENOUS at 12:02

## 2019-05-28 RX ADMIN — FENTANYL CITRATE 50 MCG: 50 INJECTION, SOLUTION INTRAMUSCULAR; INTRAVENOUS at 12:05

## 2019-05-28 ASSESSMENT — COGNITIVE AND FUNCTIONAL STATUS - GENERAL
MOVING TO AND FROM BED TO CHAIR: 2 - A LOT
DRESSING REGULAR LOWER BODY CLOTHING: 2 - A LOT
WALKING IN HOSPITAL ROOM: 2 - A LOT
EATING MEALS: 2 - A LOT
HELP NEEDED FOR PERSONAL GROOMING: 2 - A LOT
TOILETING: 2 - A LOT
STANDING UP FROM CHAIR USING ARMS: 2 - A LOT
CLIMB 3 TO 5 STEPS WITH RAILING: 2 - A LOT
DRESSING REGULAR UPPER BODY CLOTHING: 2 - A LOT
HELP NEEDED FOR BATHING: 2 - A LOT

## 2019-05-28 NOTE — OP NOTE
REPORT TYPE:  Operative Note    DATE OF OPERATION:  05/28/2019      PREOPERATIVE DIAGNOSES:  Destroyed left hip joint secondary to breast carcinoma status post radiation therapy with fractured femoral head and fractured acetabulum.  The patient also has severe gait dysfunction.    POSTOPERATIVE DIAGNOSES:  Destroyed left hip joint secondary to breast carcinoma status post radiation therapy with fractured femoral head and fractured acetabulum the patient also has severe gait dysfunction.    OPERATIVE PROCEDURES:  1.  Left total hip arthroplasty using Melissa constrained hip system.  2.  Open reduction and internal fixation of left ilium with gap cup plating system.  3.  Neurolysis and visualization of the sciatic nerve.    ANESTHESIA:  General.    SURGEON:  Skip Ugarte MD.    FIRST ASSISTANT:  Syl Sampson DO    ESTIMATED BLOOD LOSS:  1100 mL.    FLUIDS:  The patient received two units of packed cells and 3000 mL of crystalloid.    DRAINS:  One.    OPERATIVE INDICATIONS:  Tatum Dhaliwal is a 43-year-old black female with date of birth of 1975, who presents with severe incapacitating left hip pain and inability to ambulate.  Imaging studies, which were on hand for today's procedure demonstrates   a fracture and collapse of the femoral head and destruction of the acetabulum with proximal migration of the remnant femoral head.  The patient elects and specifically requesting a junction of informed consent operative procedure in the form of   replacement of the left hip with internal fixation and reconstruction of the pelvis.  Operative procedure was discussed in great depth and detail.  Questions were answered.  Risks were discussed.  Informed consent was obtained by me.    DESCRIPTION OF PROCEDURE:  After informed consent was obtained, the patient was identified and surgical site marked.  Tatum was taken back to the operating room.  General anesthesia was administered.  A Holm was placed using sterile  technique.  The   patient was then turned into the lateral decubitus position, left side up and secured with a beanbag and axillary roll.  All pressure points were padded.  The patient received preoperative antibiotic prophylaxis in accordance with SCIP measures.  After   thorough prep and thorough timeout, an incision was then made over the proximal femur curving behind the greater trochanter and then aiming towards the anterior ilium and then down to the posterior ilium.  An iliofemoral approach was utilized.    Dissection was then carried down to the deeper posterior soft tissues.  There was a fair amount of radiation scar tissue formation.  Therefore, meticulous neurolysis of the sciatic nerve was performed in order to avoid any inadvertent injury to the   structures ____.  Hip was dislocated.  Femoral head was found to be extremely deformed and collapsed.  Acetabulum was also severely destroyed.  Reaming was then done to accept a 54 mm OD gap cup.  This was then placed and the screws were then placed into   the ilium with excellent purchase.  Next, the medullary canal of the femur was prepared to receive a [place] #4 cemented stem.  Constrained liner was then cemented in as well.  The components were in excellent position.  Multiple times during the   operative procedure, the wound was irrigated out with copious amounts of antibiotic saline solution.  Because of the history of malignancy and radiation therapy, Rapid Cure beads were placed into the depths of the wound containing vancomycin.  Meticulous   multilayer closure was then performed over deep Hemovac drainage.  Skin was reapproximated meticulously with staples.  Silver dressings were then applied.  The patient was then placed in an abduction pillow.  Postoperative examination in recovery room   demonstrated an excellent dorsalis pedis and posterior tibial pulse, and she was able to wiggle her toes in the right and left foot in all planes.      DAVID  MIKE DAVID MD        CC:     DD: 05/28/2019 14:47  DT: 05/28/2019 17:02  Voice ID: [id number]/Report ID: [id number]  ptslabe

## 2019-05-28 NOTE — PLAN OF CARE
Problem: Patient Care Overview  Goal: Plan of Care Review  Outcome: Ongoing (interventions implemented as appropriate)   05/28/19 1726   Coping/Psychosocial   Plan Of Care Reviewed With patient   Plan of Care Review   Progress improving       Problem: Pressure Ulcer Risk (Nathan Scale) (Adult,Obstetrics,Pediatric)  Goal: Skin Integrity  Outcome: Ongoing (interventions implemented as appropriate)      Problem: Hip Arthroplasty (Total, Partial) (Adult)  Goal: Signs and Symptoms of Listed Potential Problems Will be Absent, Minimized or Managed (Hip Arthroplasty)  Outcome: Ongoing (interventions implemented as appropriate)      Problem: Fall Risk (Adult)  Goal: Absence of Falls  Outcome: Ongoing (interventions implemented as appropriate)      Problem: Pain, Acute (Adult)  Goal: Acceptable Pain Control/Comfort Level  Outcome: Ongoing (interventions implemented as appropriate)

## 2019-05-28 NOTE — ASSESSMENT & PLAN NOTE
- severe left hip pain stemming from end stage OA of the hip with marked collapse and bone loss of the left femoral head.  - POD# 0 S/P L PALOMA with gap ring and constraint cup  - peggy-operative antibiotics, pain management, bowel regimen, and disposition per primary team  - recommend incentive spirometry to minimize post-operative pulmonary complications  - dvt prophylaxis per primary team

## 2019-05-28 NOTE — PERIOPERATIVE NURSING NOTE
Patient received from OR - PACU. Encouraged coughing and deep breathing. Assessment done. Dr. Ugarte at bedside.

## 2019-05-28 NOTE — ANESTHESIA POSTPROCEDURE EVALUATION
Patient: Tatum Dhaliwal    Procedure Summary     Date:  05/28/19 Room / Location:  [facility] OR 12 / [facility] OR    Anesthesia Start:  1101 Anesthesia Stop:  1430    Procedure:  Left Total Hip Arthroplasty with Gap Ring and Constraint Cup (Left Hip) Diagnosis:       Arthritis of left hip      (M16.12)    Surgeon:  Skip Ugarte MD Responsible Provider:  Omega Gunn MD    Anesthesia Type:  general ASA Status:  3          Anesthesia Type: general  PACU Vitals  5/28/2019 1422 - 5/28/2019 1437      5/28/2019 1430             BP: (!)  115/57    Temp: 36.3 °C (97.3 °F)    Pulse: 70    Resp: 16    SpO2: 92 %            Anesthesia Post Evaluation    Pain management: adequate  Patient location during evaluation: PACU  Patient participation: complete - patient participated  Level of consciousness: awake and alert  Cardiovascular status: acceptable  Airway Patency: adequate  Respiratory status: acceptable  Hydration status: acceptable  Anesthetic complications: no

## 2019-05-28 NOTE — CONSULTS
Hospital Medicine Service -  Inpatient Consultation         Requesting Physician: Dr. Ugarte   Reason for Consultation: medical management     HISTORY OF PRESENT ILLNESS        This is a 42 y/o F PMH L breast ca dx 2007 now metastic to liver s/p resection 1/2017, then found to have mets to inferior pelvic bone iwth soft tissue extension 11/17 s/p XRT last 11/2018 now with end stage L hip OA, iron deficiency anemia, hypokalemia, presenting for elective L PALOMA with gap ring and constraint cup.    For full consult note, please see consult by Dr. Valadez on 5/13/2019.    Pt. States her pain is currently controlled . She knows what pain regimen works for her.  Denies N/V, chest pain, dyspnea.  Eager to walk.      PAST MEDICAL AND SURGICAL HISTORY        Past Medical History:   Diagnosis Date   • Anemia     has had iron infusions   • Breast cancer (CMS/HCC) (HCC) 2007    left breast xrt, 2008, 2017,2018/ chemo 2013, 2015, and currently   • Nausea     occas-zofran prn   • Neuropathy     hands and feet secondary to chemo       Past Surgical History:   Procedure Laterality Date   • BREAST LUMPECTOMY Left 2007   • LIVER RESECTION  2016    for mets?   • MASTECTOMY Left 2008   • PORTACATH PLACEMENT Right        PCP: Pt States, No Pcp    MEDICATIONS        Home Medications:  Prescriptions Prior to Admission   Medication Sig Dispense Refill Last Dose   • exemestane (AROMASIN) 25 mg chemo tablet TK 1 T PO D  5 5/27/2019 at 0900   • gabapentin (NEURONTIN) 300 mg capsule TK 1 C PO BID  0 5/27/2019 at 2000   • ibuprofen (MOTRIN) 200 mg tablet Take 200 mg by mouth every 6 (six) hours as needed for mild pain.   Past Week at Unknown time   • ondansetron (ZOFRAN) 8 mg tablet TK 1 T PO Q 8 H PRF NAUSEA  5 Past Month at Unknown time   • oxyCODONE (ROXICODONE) 5 mg immediate release tablet 5 mg every 8 (eight) hours as needed.    0 5/28/2019 at 0200   • sodium chloride 0.9 % parenteral solution 250 mL with ado-trastuzumab 100 mg recon  "soln Infuse into a venous catheter every 21 (twentyone) days.     Past Month at Unknown time   • leuprolide acetate (LUPRON DEPOT IM) Inject into the shoulder, thigh, or buttocks every 30 (thirty) days.   5/6/2019       Current inpatient medications were personally reviewed.    ALLERGIES        Morphine and Amoxicillin    FAMILY HISTORY        History reviewed. No pertinent family history.    SOCIAL HISTORY        Social History     Social History   • Marital status:      Spouse name: N/A   • Number of children: N/A   • Years of education: N/A     Social History Main Topics   • Smoking status: Never Smoker   • Smokeless tobacco: Never Used   • Alcohol use No   • Drug use: No   • Sexual activity: Not Asked     Other Topics Concern   • None     Social History Narrative   • None       REVIEW OF SYSTEMS        All other systems reviewed and negative except as noted in HPI    PHYSICAL EXAMINATION        /65 (BP Location: Right upper arm, Patient Position: Lying)   Pulse 99   Temp 36.8 °C (98.3 °F) (Oral)   Resp 18   Ht 1.651 m (5' 5\")   Wt 89.4 kg (197 lb)   SpO2 98%   BMI 32.78 kg/m²   Body mass index is 32.78 kg/m².    Intake/Output Summary (Last 24 hours) at 05/28/19 1819  Last data filed at 05/28/19 1629   Gross per 24 hour   Intake             3650 ml   Output             1890 ml   Net             1760 ml       Physical Exam   Gen: Obese female lying in bed, NAD  Vitals: reviewed, as above  HEENT: NCAT, PERRL, EOMI  L Breast: s/p reconstruction, scar  CV: RRR, +S1, +S2, no m/r/g appreciated  Pulm: CTA b/l  Abd: soft, obese, NT, ND, +bs  Ext: wwp, ?trace b/l LE edema, bandage R hip in T-shape, drain in place draining dark red blood.  Neuro: AAOx3, nonfocal, did not test R leg  Psych: pleasant, cooperative  Derm: no rashes  LABS / EKG        Labs  Post-op CBC reviewed, WBC 15.57, Hgb 11.1 stable from prior    ECG/Telemetry  Tele: NSR, rate 92    Imaging  R hip Xray: Status post total hip " arthroplasty/left pelvic sidewall cortical plate and screw fixation.    ASSESSMENT AND RECOMMENDATIONS           * Breast cancer metastasized to bone, left (CMS/HCC) (HCC)   Assessment & Plan    - outpatient f/u  - f/u pathology results     S/P total hip arthroplasty   Assessment & Plan    - severe left hip pain stemming from end stage OA of the hip with marked collapse and bone loss of the left femoral head.  - POD# 0 S/P L PALOMA with gap ring and constraint cup  - peggy-operative antibiotics, pain management, bowel regimen, and disposition per primary team  - recommend incentive spirometry to minimize post-operative pulmonary complications  - dvt prophylaxis per primary team     Leukemoid reaction   Assessment & Plan    - likely due to surgery - trend     Class 1 obesity due to excess calories without serious comorbidity with body mass index (BMI) of 32.0 to 32.9 in adult   Assessment & Plan    - dietary counseling, outpatient f/u     Iron deficiency anemia   Assessment & Plan    - trend with post-operative blood loss; transfuse > 7  - outpatient f/u, gets intermittent iron transfusions          Will sign off.  Please page [phone] with questions or concerns     Marianela Montalvo MD  5/28/2019

## 2019-05-28 NOTE — PROGRESS NOTES
Patient: Tatum Dhaliwal  Location: [place] [address]  MRN: [id number]  Today's date: 5/28/2019    Attempted to see patient for therapy. Unable due to medical hold (spoke w/nsg: pt w/significant pain: for pain meds. ).  Will cont to follow.

## 2019-05-28 NOTE — ANESTHESIA PROCEDURE NOTES
Airway  Urgency: elective    Start Time: 5/28/2019 11:15 AM  Airway not difficult    General Information and Staff    Patient location during procedure: OR  Anesthesiologist: OBIE BAUTISTA  Resident/CRNA: WESTON LUNDBERG  Performed: resident/CRNA     Indications and Patient Condition  Indications for airway management: anesthesia  Sedation level: deep  Preoxygenated: yes  Patient position: sniffing  MILS maintained throughout  Mask difficulty assessment: 1 - vent by mask    Final Airway Details  Final airway type: endotracheal airway      Successful airway: ETT    Successful intubation technique: direct laryngoscopy  Endotracheal tube insertion site: oral  Blade: Jerald  Blade size: #3  ETT size: 7.5 mm  Cormack-Lehane Classification: grade I - full view of glottis  Placement verified by: chest auscultation and capnometry   Measured from: teeth  ETT to teeth (cm): 22  Number of attempts at approach: 1  Number of other approaches attempted: 0  Atraumatic airway insertion

## 2019-05-28 NOTE — ASSESSMENT & PLAN NOTE
- trend with post-operative blood loss; transfuse > 7  - outpatient f/u, gets intermittent iron transfusions

## 2019-05-28 NOTE — ANESTHESIA PREPROCEDURE EVALUATION
Anesthesia ROS/MED HX    Anesthesia History - neg  Pulmonary - neg  Neuro/Psych - neg  Cardiovascular- neg  GI/Hepatic- neg  Musculoskeletal- neg  Endo/Other- neg    Patient Active Problem List   Diagnosis   • Preop examination   • Primary osteoarthritis of left hip   • Breast cancer metastasized to bone, left (CMS/HCC) (HCC)   • S/P total hip arthroplasty       Current Facility-Administered Medications   Medication Dose Route Frequency   • ceFAZolin  2 g intravenous 60 min Pre-Op   • povidone-iodine  1 application Each Nostril 60 min Pre-Op   • sodium chloride 0.9 %   intravenous Once   • tranexamic acid  20 mg/kg (Order-Specific) intravenous Once   • vancomycin  1,250 mg intravenous Once       Prior to Admission medications    Medication Sig Start Date End Date Taking? Authorizing Provider   exemestane (AROMASIN) 25 mg chemo tablet TK 1 T PO D 5/2/19  Yes Jennifer Duffy MD   gabapentin (NEURONTIN) 300 mg capsule TK 1 C PO BID 5/7/19  Yes Jennifer Duffy MD   ibuprofen (MOTRIN) 200 mg tablet Take 200 mg by mouth every 6 (six) hours as needed for mild pain.   Yes Jennifer Duffy MD   ondansetron (ZOFRAN) 8 mg tablet TK 1 T PO Q 8 H PRF NAUSEA 5/7/19  Yes Jennifer Duffy MD   oxyCODONE (ROXICODONE) 5 mg immediate release tablet 5 mg every 8 (eight) hours as needed.   4/30/19  Yes Jennifer Duffy MD   sodium chloride 0.9 % parenteral solution 250 mL with ado-trastuzumab 100 mg recon soln Infuse into a venous catheter every 21 (twentyone) days.     Yes Jennifer Duffy MD   leuprolide acetate (LUPRON DEPOT IM) Inject into the shoulder, thigh, or buttocks every 30 (thirty) days.    Jennifer Duffy MD       CBC Results       05/13/19                          1415           WBC 7.97           RBC 4.47           HGB 11.0 (L)           HCT 35.2           MCV 78.7 (L)           MCH 24.6 (L)           MCHC 31.3 (L)                                      BMP Results       05/13/19                           1415                      K 3.7           Cl 105           CO2 25           Glucose 98           BUN 8           Creatinine 0.4 (L)           Calcium 9.6           Anion Gap 11           EGFR &gt;60.0                           Lab Results   Component Value Date    HCGPREGUR Negative 05/28/2019    HCGQUANT 1.2 05/13/2019             No orders to display       Past Surgical History:   Procedure Laterality Date   • BREAST LUMPECTOMY Left 2007   • LIVER RESECTION  2016    for mets?   • MASTECTOMY Left 2008   • PORTACATH PLACEMENT Right        Physical Exam    Airway   Mallampati: II   TM distance: >3 FB   Neck ROM: full  Cardiovascular - normal   Rhythm: regular   Rate: normal  Pulmonary - normal   clear to auscultation  Dental - normal        Anesthesia Plan    Plan: general    Technique: general endotracheal     not instructed to abstain from smoking on day of procedure    Patient did not smoke on day of surgery  ASA 3  Anesthetic plan and risks discussed with: patient  Induction:    intravenous   Postop Plan:   Patient Disposition: inpatient floor planned admission   Pain Management: IV analgesics

## 2019-05-28 NOTE — BRIEF OP NOTE
Left Total Hip Arthroplasty with Gap Ring and Constraint Cup (L) Procedure Note    Procedure:    Left Total Hip Arthroplasty with Gap Ring and Constraint Cup  CPT(R) Code:  32095 - HI TOTAL HIP ARTHROPLASTY      Pre-op Diagnosis     * Arthritis of left hip [M16.12]       Post-op Diagnosis     * Arthritis of left hip [M16.12]    Surgeon(s) and Role:     * Skip Ugarte MD - Primary    Anesthesia: General    Staff:   Circulator: Jayda Jones RN; Beena Qiu RN  Scrub Person: Ami Garcia RN    Procedure Details   See full dictated op report    Estimated Blood Loss: 1100 mL    Specimens:                  Order Name Source Comment Collection Info Order Time   TYPE AND SCREEN Blood, Venous   5/28/2019 11:21 AM   TYPE AND SCREEN Blood, Venous   Pre-op diagnosis:M16.12 Collected By: Omega Gunn MD 5/28/2019 11:29 AM   TYPE AND SCREEN Blood, Venous   Pre-op diagnosis:M16.12 Collected By: Omega Gunn MD 5/28/2019 11:36 AM   REPEAT ABO/RH (TYPE CHECK) Blood, Venous  Collected By: Omega Gunn MD 5/28/2019 12:28 PM   PREPARE RBC    5/28/2019 11:21 AM    Transfusion indications  Pre-OP major surgery with bleeding risk       Has consent been obtained?  Yes       Is the patient pregnant?  No       Pregnant in last 3 months?  No      PREPARE RBC    5/28/2019 12:18 PM    Transfusion indications  Pre-OP major surgery with bleeding risk       Has consent been obtained?  Yes       Is the patient pregnant?  No       Pregnant in last 3 months?  No      PATHOLOGY TISSUE EXAM Bone (specify site)   Pre-op diagnosis:M16.12 Collected By: Skip Ugarte MD 5/28/2019  1:40 PM         Drains:   Closed/Suction Drain 1 Left Hip 19 Fr. (Active)       Urethral Catheter Double-lumen;Latex 16 Fr (Active)       Implants:   Implant Name Type Inv. Item Serial No.  Lot No. LRB No. Used   SHELL ACETABULAR RESTORATION GAP II 52MM LEFT - [id number]  SHELL ACETABULAR RESTORATION GAP II 52MM LEFT   [employer] [id number] Left 1   SCREW GAP PLATE 6.5MM 20MM - [id number] Acetabular screw SCREW GAP PLATE 6.5MM 20MM  [employer] [id number] Left 1   SCREW GAP PLATE 6.5MM 20MM - [id number] Acetabular screw SCREW GAP PLATE 6.5MM 20MM  [employer] [id number] Left 1   SCREW GAP PLATE 6.5MM 15MM - [id number] Acetabular screw SCREW GAP PLATE 6.5MM 15MM  [employer] [id number] Left 1   SCREW GAP PLATE 6.5MM 15MM - [id number] Acetabular screw SCREW GAP PLATE 6.5MM 15MM  [employer] [id number] Left 1   SCREW GAP PLATE 6.5MM 15MM - [id number] Acetabular screw SCREW GAP PLATE 6.5MM 15MM  [employer] [id number] Left 1   SCREW GAP PLATE 6.5MM 15MM - [id number] Acetabular screw SCREW GAP PLATE 6.5MM 15MM  [employer] [id number] Left 1   CEMENT BONE SIMPLEX W/TOBRAMYCIN - [id number]  CEMENT BONE SIMPLEX W/TOBRAMYCIN  [employer] [id number] Left 3   CEMENT BONE SIMPLEX W/TOBRAMYCIN - [id number]  CEMENT BONE SIMPLEX W/TOBRAMYCIN  [employer] [id number] Left 1   STIMULAN BONE VOID FILLER 20CC - [id number] Bone graft extender or substitute STIMULAN BONE VOID FILLER 20CC  [employer] [facility] 01/19-R389/391 Left 1   INSERT ALL POLY CONSTRAINED 44MM - [id number]  INSERT ALL POLY CONSTRAINED 44MM  [employer] [id number] Left 1   SCREW GAP PLATE 6.5MM 15MM - [id number] Acetabular screw SCREW GAP PLATE 6.5MM 15MM  [employer] [id number] Left 1   SCREW GAP PLATE 6.5MM 20MM - [id number] Acetabular screw SCREW GAP PLATE 6.5MM 20MM  [employer] [id number] Left 1   UNIVERSAL CEMENT RESTRICTOR    [employer] [id number] Left 1   CEMENTED HIP STEM, OMNIFIT IRAM PLUS, 127 - [id number]  CEMENTED HIP STEM, OMNIFIT IRAM PLUS, 127  [employer] [id number] Left 1   C-TAPER HEAD 22MM +5 - [id number] Femoral head C-TAPER HEAD 22MM +5   [employer] [id number] Left 1              Complications:  None; patient tolerated the procedure well.           Disposition: PACU - hemodynamically stable.            Condition: stable    Skip Ugarte MD  Phone Number: [phone]

## 2019-05-29 LAB
ABO + RH BLD: NORMAL
ANION GAP SERPL CALC-SCNC: 7 MEQ/L (ref 3–15)
BASOPHILS # BLD: 0.01 K/UL (ref 0.01–0.1)
BASOPHILS NFR BLD: 0.1 %
BLD GP AB SCN SERPL QL: NEGATIVE
BUN SERPL-MCNC: 5 MG/DL (ref 8–20)
CALCIUM SERPL-MCNC: 8.2 MG/DL (ref 8.9–10.3)
CHLORIDE SERPL-SCNC: 109 MEQ/L (ref 98–109)
CO2 SERPL-SCNC: 23 MEQ/L (ref 22–32)
CREAT SERPL-MCNC: 0.5 MG/DL
D AG BLD QL: NEGATIVE
DIFFERENTIAL METHOD BLD: ABNORMAL
EOSINOPHIL # BLD: 0 K/UL (ref 0.04–0.36)
EOSINOPHIL NFR BLD: 0 %
ERYTHROCYTE [DISTWIDTH] IN BLOOD BY AUTOMATED COUNT: 16.9 % (ref 11.7–14.4)
GFR SERPL CREATININE-BSD FRML MDRD: >60 ML/MIN/1.73M*2
GLUCOSE SERPL-MCNC: 151 MG/DL (ref 70–99)
HCT VFR BLDCO AUTO: 29.1 %
HGB BLD-MCNC: 9.2 G/DL
IMM GRANULOCYTES # BLD AUTO: 0.08 K/UL (ref 0–0.08)
IMM GRANULOCYTES NFR BLD AUTO: 0.6 %
LABORATORY COMMENT REPORT: NORMAL
LYMPHOCYTES # BLD: 2.04 K/UL (ref 1.2–3.5)
LYMPHOCYTES NFR BLD: 15.5 %
MCH RBC QN AUTO: 25.3 PG (ref 28–33.2)
MCHC RBC AUTO-ENTMCNC: 31.6 G/DL (ref 32.2–35.5)
MCV RBC AUTO: 80.2 FL (ref 83–98)
MONOCYTES # BLD: 1.3 K/UL (ref 0.28–0.8)
MONOCYTES NFR BLD: 9.9 %
NEUTROPHILS # BLD: 9.73 K/UL (ref 1.7–7)
NEUTS SEG NFR BLD: 73.9 %
NRBC BLD-RTO: 0 %
PDW BLD AUTO: 10.9 FL (ref 9.4–12.3)
PLATELET # BLD AUTO: 193 K/UL
POTASSIUM SERPL-SCNC: 4.2 MEQ/L (ref 3.6–5.1)
RBC # BLD AUTO: 3.63 M/UL (ref 3.93–5.22)
SODIUM SERPL-SCNC: 139 MEQ/L (ref 136–144)
WBC # BLD AUTO: 13.16 K/UL

## 2019-05-29 PROCEDURE — 97166 OT EVAL MOD COMPLEX 45 MIN: CPT | Mod: GO

## 2019-05-29 PROCEDURE — 12000000 HC ROOM AND CARE MED/SURG

## 2019-05-29 PROCEDURE — 85027 COMPLETE CBC AUTOMATED: CPT | Performed by: PHYSICIAN ASSISTANT

## 2019-05-29 PROCEDURE — 97161 PT EVAL LOW COMPLEX 20 MIN: CPT | Mod: GP

## 2019-05-29 PROCEDURE — 63700000 HC SELF-ADMINISTRABLE DRUG: Performed by: PHYSICIAN ASSISTANT

## 2019-05-29 PROCEDURE — 63600000 HC DRUGS/DETAIL CODE: Performed by: PHYSICIAN ASSISTANT

## 2019-05-29 PROCEDURE — 80048 BASIC METABOLIC PNL TOTAL CA: CPT | Performed by: PHYSICIAN ASSISTANT

## 2019-05-29 PROCEDURE — 36415 COLL VENOUS BLD VENIPUNCTURE: CPT | Performed by: PHYSICIAN ASSISTANT

## 2019-05-29 PROCEDURE — 86901 BLOOD TYPING SEROLOGIC RH(D): CPT

## 2019-05-29 PROCEDURE — 25800000 HC PHARMACY IV SOLUTIONS: Performed by: PHYSICIAN ASSISTANT

## 2019-05-29 RX ADMIN — CEFAZOLIN SODIUM 2 G: 2 SOLUTION INTRAVENOUS at 02:54

## 2019-05-29 RX ADMIN — POLYETHYLENE GLYCOL 3350 17 G: 17 POWDER, FOR SOLUTION ORAL at 09:41

## 2019-05-29 RX ADMIN — ONDANSETRON 4 MG: 4 TABLET, ORALLY DISINTEGRATING ORAL at 06:01

## 2019-05-29 RX ADMIN — POTASSIUM CHLORIDE, DEXTROSE MONOHYDRATE AND SODIUM CHLORIDE 125 ML/HR: 150; 5; 900 INJECTION, SOLUTION INTRAVENOUS at 02:58

## 2019-05-29 RX ADMIN — KETOROLAC TROMETHAMINE 7.5 MG: 15 INJECTION, SOLUTION INTRAMUSCULAR; INTRAVENOUS at 13:40

## 2019-05-29 RX ADMIN — MULTIPLE VITAMINS W/ MINERALS TAB 1 TABLET: TAB at 17:44

## 2019-05-29 RX ADMIN — EXEMESTANE 25 MG: 25 TABLET ORAL at 16:39

## 2019-05-29 RX ADMIN — PANTOPRAZOLE SODIUM 40 MG: 40 TABLET, DELAYED RELEASE ORAL at 10:44

## 2019-05-29 RX ADMIN — OXYCODONE HYDROCHLORIDE 10 MG: 5 TABLET ORAL at 20:18

## 2019-05-29 RX ADMIN — OXYCODONE HYDROCHLORIDE 10 MG: 5 TABLET ORAL at 06:00

## 2019-05-29 RX ADMIN — SENNOSIDES AND DOCUSATE SODIUM 1 TABLET: 8.6; 5 TABLET ORAL at 20:09

## 2019-05-29 RX ADMIN — KETOROLAC TROMETHAMINE 7.5 MG: 15 INJECTION, SOLUTION INTRAMUSCULAR; INTRAVENOUS at 00:11

## 2019-05-29 RX ADMIN — KETOROLAC TROMETHAMINE 7.5 MG: 15 INJECTION, SOLUTION INTRAMUSCULAR; INTRAVENOUS at 17:45

## 2019-05-29 RX ADMIN — ASPIRIN 325 MG: 325 TABLET ORAL at 20:09

## 2019-05-29 RX ADMIN — ASPIRIN 325 MG: 325 TABLET ORAL at 09:41

## 2019-05-29 RX ADMIN — OXYCODONE HYDROCHLORIDE 10 MG: 5 TABLET ORAL at 09:39

## 2019-05-29 RX ADMIN — ACETAMINOPHEN 975 MG: 325 TABLET ORAL at 17:44

## 2019-05-29 RX ADMIN — FERROUS SULFATE TAB 325 MG (65 MG ELEMENTAL FE) 325 MG: 325 (65 FE) TAB at 09:41

## 2019-05-29 RX ADMIN — OXYCODONE HYDROCHLORIDE 10 MG: 5 TABLET ORAL at 02:52

## 2019-05-29 RX ADMIN — GABAPENTIN 300 MG: 300 CAPSULE ORAL at 09:41

## 2019-05-29 RX ADMIN — SENNOSIDES AND DOCUSATE SODIUM 1 TABLET: 8.6; 5 TABLET ORAL at 09:40

## 2019-05-29 RX ADMIN — CELECOXIB 200 MG: 200 CAPSULE ORAL at 09:40

## 2019-05-29 RX ADMIN — OXYCODONE HYDROCHLORIDE 10 MG: 5 TABLET ORAL at 16:22

## 2019-05-29 RX ADMIN — ACETAMINOPHEN 325 MG: 325 TABLET ORAL at 06:01

## 2019-05-29 RX ADMIN — GABAPENTIN 300 MG: 300 CAPSULE ORAL at 20:09

## 2019-05-29 RX ADMIN — KETOROLAC TROMETHAMINE 7.5 MG: 15 INJECTION, SOLUTION INTRAMUSCULAR; INTRAVENOUS at 06:02

## 2019-05-29 RX ADMIN — ACETAMINOPHEN 325 MG: 325 TABLET ORAL at 02:52

## 2019-05-29 ASSESSMENT — COGNITIVE AND FUNCTIONAL STATUS - GENERAL
STANDING UP FROM CHAIR USING ARMS: 3 - A LITTLE
DRESSING REGULAR LOWER BODY CLOTHING: 2 - A LOT
TOILETING: 2 - A LOT
WALKING IN HOSPITAL ROOM: 3 - A LITTLE
MOVING TO AND FROM BED TO CHAIR: 3 - A LITTLE
HELP NEEDED FOR PERSONAL GROOMING: 4 - NONE
CLIMB 3 TO 5 STEPS WITH RAILING: 3 - A LITTLE
DRESSING REGULAR UPPER BODY CLOTHING: 3 - A LITTLE
EATING MEALS: 4 - NONE
HELP NEEDED FOR BATHING: 2 - A LOT

## 2019-05-29 NOTE — PLAN OF CARE
Problem: Patient Care Overview  Goal: Discharge Needs Assessment  Outcome: Ongoing (interventions implemented as appropriate)   05/28/19 1800 05/29/19 1055   DC Needs Assessment   Concerns To Be Addressed --  denies needs/concerns at this time   Readmission Within The Last 30 Days --  no previous admission in last 30 days   Provider Choice List(s) Given no --    Anticipated Discharge Disposition --  home with assistance   Equipment Needed After Discharge none --    Current Discharge Risk --  chronically ill   Current Health   Anticipated Changes Related to Illness --  inability to care for self   Activity/Self Care ROS   Equipment Currently Used at Home --  cane, quad     Chart reviewed. Patient is POD 1 L THR with ring. Met with patient to discuss potential d/c needs. Patient lives with her  and 4 children in multistory home, 7 [address], powder room, FF to second floor. She has a quad cane, denies hx of HHC and SNF. Her plan is for home, her  can transport and he or her children can assist, per patient, she inever alone. She reports her oncologist is her PCP, Ludwig Cortez [phone]. -Patient is 50% WB. Current PT recs are for SNF. Will continue to follow patient and assist with any skilled discharge needs. -- Seema Shi RN CC [id number]

## 2019-05-29 NOTE — PATIENT CARE CONFERENCE
Care Progression Rounds Note  Date: 5/29/2019  Time: 10:49 AM     Patient Name: Tatum Dhaliwal     Medical Record Number: [id number]   YOB: 1975  Sex: Female      Room/Bed: 0183    Admitting Diagnosis: Arthritis of left hip [M16.12]  S/P total hip arthroplasty [Z96.649]   Admit Date/Time: 5/28/2019  8:35 AM    Primary Diagnosis: Breast cancer metastasized to bone, left (CMS/HCC) (HCC)  Principal Problem: Breast cancer metastasized to bone, left (CMS/HCC) (HCC)    GMLOS: pending  Anticipated Discharge Date: 5/30/2019    AM-PAC  Mobility Score: 12    Discharge Planning:  Concerns To Be Addressed: no discharge needs identified  Anticipated Discharge Disposition: home with assistance    Barriers to Discharge:  Barriers to Discharge: Therapy update pending, Consultant recommendations pending    Participants:  advanced practice provider, , nursing, physical therapy

## 2019-05-29 NOTE — PATIENT CARE CONFERENCE
Care Progression Rounds Note  Date: 5/29/2019  Time: 10:49 AM     Patient Name: Tatum Dhaliwal     Medical Record Number: [id number]   YOB: 1975  Sex: Female      Room/Bed: 0183    Admitting Diagnosis: Arthritis of left hip [M16.12]  S/P total hip arthroplasty [Z96.649]   Admit Date/Time: 5/28/2019  8:35 AM    Primary Diagnosis: Breast cancer metastasized to bone, left (CMS/HCC) (HCC)  Principal Problem: Breast cancer metastasized to bone, left (CMS/HCC) (HCC)    GMLOS: pending  Anticipated Discharge Date: 5/30/2019    AM-PAC  Mobility Score: 12    Discharge Planning:  Concerns To Be Addressed: no discharge needs identified  Anticipated Discharge Disposition: home with assistance    Barriers to Discharge:  Barriers to Discharge: Therapy update pending, Consultant recommendations pending    Participants:  advanced practice provider, , nursing, physical therapy

## 2019-05-29 NOTE — HOSPITAL COURSE
Tatum is a 43 y.o. female admitted on 5/28/2019 with Arthritis of left hip [M16.12]  S/P total hip arthroplasty [Z96.649]. Principal problem is Breast cancer metastasized to bone, left (CMS/HCC) (HCC).    Tatum has a past medical history of Anemia; Breast cancer (CMS/HCC) (HCC) (2007); Nausea; and Neuropathy.     s/p   1.  Left total hip arthroplasty using [place] constrained hip system.  2.  Open reduction and internal fixation of left ilium with gap cup plating system.  3.  Neurolysis and visualization of the sciatic nerve.

## 2019-05-29 NOTE — PROGRESS NOTES
Patient: aTtum Dhaliwal  Location: [place] [address]  MRN: [id number]  Today's date: 5/29/2019    Pt received supine and left seated in hip chair with sheet, call light in reach, needs accessible. Nurse aide present at end of eval, RN aware of pt position/performance. Spoke to PT and  regarding recommendations.           Therapy Pain/Vitals     Row Name 05/29/19 0930 05/29/19 1014       Pain/Comfort/Sleep    Pain Charting Type Pain Assessment  --    Presence of Pain complains of pain/discomfort  --    Preferred Pain Scale number (Numeric Rating Pain Scale)  --    Pain Body Location - Side Left  --    Pain Body Location hip  --    Pain Rating (0-10): Rest 7  --    Pain Rating (0-10): Activity 8  --    Pain Management Interventions premedicated for activity;position adjusted  --       Vital Signs    Pulse 94 (!)  102    SpO2 94 % 95 %    Patient Activity At rest --   following transfer    Oxygen Therapy None (Room air) None (Room air)    BP (!)  107/59 (!)  112/53          Prior Living Environment      Most Recent Value   Lives With  child(oscar), adult, spouse   Living Arrangements  house   Living Environment Comment  3SH, 7 REKHA, bed and bath 2nd floor, tub/shower   Number of Stairs, Main Entrance  seven   Stair Railings, Main Entrance  railings safe and in good condition   Equipment Currently Used at Home  cane, quad   Stairs, Within Home, Primary  13   Stair Railings, Within Home, Primary  railings safe and in good condition          Prior Level of Function      Most Recent Value   Ambulation  assistive equipment   Transferring  assistive equipment   Toileting  assistive equipment   Bathing  assistive person   Dressing  assistive person   Eating  independent   Communication  understands/communicates without difficulty   Swallowing  swallows foods/liquids without difficulty   Equipment Currently Used at Home  cane, quad   Prior Functional Level Comment  Pt initially reports I with  ADLs and mob with SPC PTA, later in eval states she needed assist with dressing and sometimes needing assist with bed mob/xfers                OT Evaluation - 05/29/19 0930        Session Details    Document Type initial evaluation    Mode of Treatment occupational therapy       Time Calculation    Start Time 0930    Stop Time 1016    Time Calculation (min) 46 min       General Information    Patient Profile Reviewed? yes    Onset of Illness/Injury or Date of Surgery 05/28/19    Referring Physician Tata    Pertinent History of Current Functional Problem s/p L PALOMA, hx breast CA dx 2007, mets to liver s/p resection 1/17, mets to pelvis s/p XRT 11/18    Existing Precautions/Restrictions fall;weight bearing;hip       Weight Bearing Status    Left LE Weight Bearing Status partial weight bearing   50%       Coping Strategies    Trust Relationship/Rapport care explained;questions answered;questions encouraged;thoughts/feelings acknowledged;empathic listening provided;reassurance provided       Orientation Log    [facility] 3-->spontaneous/free recall    [facility] 3-->spontaneous/free recall    Name of [facility] 3-->spontaneous/free recall    Month 3-->spontaneous/free recall    Date 3-->spontaneous/free recall    Year 3-->spontaneous/free recall    Day of Week 3-->spontaneous/free recall       Cognition/Psychosocial    Safety Awareness intact       Attention    Behavioral Observations WFL       Sensory    Sensory General Assessment light touch sensation deficits identified   pt reports intermittent tingling R hand       Range of Motion (ROM)    General Range of Motion no range of motion deficits identified   BUEs WFL       Manual Muscle Testing (MMT)    General MMT Assessment upper extremity strength deficits identified   BUEs grossly 3+/5       Bed Mobility    [place], Supine to Sit moderate assist (50% patient effort)    Assistive Device (Bed Mobility) bed rails;head of bed elevated    Comment (Bed Mobility)  extended time and cueing       Sit to Stand Transfer    [place], Sit to Stand Transfer minimum assist (75% patient effort);verbal cues    Verbal Cues hand placement;technique;maintaining precautions    Assistive Device walker, front-wheeled    Comment extended time and cueing       Stand to Sit Transfer    [place], Stand to Sit Transfer minimum assist (75% patient effort);verbal cues    Verbal Cues hand placement;technique;maintaining precautions    Assistive Device walker, front-wheeled       Stand Pivot/Stand Step Transfer    [place], Stand Pivot/Stand Step Transfer minimum assist (75% or more patient effort);verbal cues    Verbal Cues hand placement;maintaining precautions;safety;technique    Assistive Device walker, front-wheeled    Comment edu re maintaining 50% WB in amb/SPS xfer       Lower Body Dressing    Lower Body Dressing Tasks don;doff;socks    Lower Body Dressing Position edge of bed sitting    Lower Body Dressing Device dressing stick;reacher;sock-aid    Lower Body Dressing [place] moderate assist (50-74% patient effort);verbal cues    Comment difficulty 2/2 pain       AM-PAC (TM) - ADL (Current Function)    Putting on and taking off regular lower body clothing? 2 - A Lot    Bathing? 2 - A Lot    Toileting? 2 - A Lot    Putting on/taking off regular upper body clothing? 3 - A Little    How much help for taking care of personal grooming? 4 - None    Eating meals? 4 - None    AM-PAC (TM) ADL Score 17       OT Clinical Impression    Patient's Goals For Discharge return home;return to all previous roles/activities    Plan For Care Reviewed: Occupational Therapy patient voices agreement with OT plan for care    Rehab Potential/Prognosis: Occupational Therapy good, to achieve stated therapy goals    OT Frequency of Treatment 5 times per week    Anticipated Equipment Needs at Discharge bedside commode;bathing equipment;dressing equipment;front wheeled walker;tub bench    OT Recommended  Discharge Disposition skilled nursing facility;home with assist;home with home health   pending progress    Daily Outcome Statement Pt completed bed mob Mod A, func xfers Min A, LE ADLs Mod A. Pt significantly limited 2/2 pain. Pt would benefit from SNF when med stable prior to d/c home. Cont to follow acutely.          Pain Assessment/Intervention  Pain Charting Type: Pain Assessment             Education provided this session. See the Patient Education summary report for full details.    OT Care Plan Goals      Most Recent Value   Lower Body Dressing Goal   Date Goal Established: Lower Body Dressing  05/29/19   Time to Achieve Goal: Lower Body Dressing  by discharge   Level of [place]  minimum assist (75% or more patient effort)   Adaptive Equipment: Lower Body Dressing  sock-aid, reacher, dressing stick   Goal Outcome: Lower Body Dressing  goal ongoing   Toilet Transfer Goal   Date Goal Established: Toilet Transfer Training  05/29/19   Time to Achieve Goal: Toilet Transfer Training  by discharge   Level of [place] Goal: Toilet Transfer Training  supervision required   Goal Outcome: Toilet Transfer Training  goal ongoing   Tub-Shower Transfer Goal   Date Goal Established: Tub-Shower Transfer Training  05/29/19   Time to Achieve Goal: Tub-Shower Transfer Training  by discharge   Level of [place] Goal: Tub-Shower Transfer Training  minimum assist (75% or more patient effort)   Goal Outcome: Tub-Shower Transfer Training  goal ongoing

## 2019-05-29 NOTE — PLAN OF CARE
Problem: Patient Care Overview  Goal: Plan of Care Review  Outcome: Ongoing (interventions implemented as appropriate)   05/29/19 0215   Coping/Psychosocial   Plan Of Care Reviewed With patient   Plan of Care Review   Progress progress toward functional goals as expected       Problem: Pressure Ulcer Risk (Nathan Scale) (Adult,Obstetrics,Pediatric)  Goal: Skin Integrity  Outcome: Ongoing (interventions implemented as appropriate)      Problem: Hip Arthroplasty (Total, Partial) (Adult)  Goal: Signs and Symptoms of Listed Potential Problems Will be Absent, Minimized or Managed (Hip Arthroplasty)  Outcome: Ongoing (interventions implemented as appropriate)      Problem: Fall Risk (Adult)  Goal: Absence of Falls  Outcome: Ongoing (interventions implemented as appropriate)      Problem: Pain, Acute (Adult)  Goal: Acceptable Pain Control/Comfort Level  Outcome: Ongoing (interventions implemented as appropriate)

## 2019-05-29 NOTE — PLAN OF CARE
Problem: Patient Care Overview  Goal: Plan of Care Review  Outcome: Ongoing (interventions implemented as appropriate)   05/29/19 1215   Coping/Psychosocial   Plan Of Care Reviewed With patient   Plan of Care Review   Outcome Summary Pt completed bed mob Mod A, func xfers Min A, LE ADLs Mod A. Pt significantly limited 2/2 pain. Pt would benefit from SNF when med stable prior to d/c home. Cont to follow acutely.     Goal: Individualization & Mutuality  Outcome: Ongoing (interventions implemented as appropriate)      Problem: Hip Arthroplasty (Total, Partial) (Adult)  Goal: Signs and Symptoms of Listed Potential Problems Will be Absent, Minimized or Managed (Hip Arthroplasty)  Outcome: Ongoing (interventions implemented as appropriate)      Problem: Acute Therapy Services Goal & Intervention Plan  Goal: Lower Body Dressing Goal  Outcome: Ongoing (interventions implemented as appropriate)    Goal: Toilet Transfer Goal  Outcome: Ongoing (interventions implemented as appropriate)    Goal: Tub-Shower Transfer Goal  Outcome: Ongoing (interventions implemented as appropriate)

## 2019-05-29 NOTE — PROGRESS NOTES
Patient: Tatum Dhaliwal  Location: [place] [address]  MRN: [id number]  Today's date: 5/29/2019    Hospital Course  Tatum is a 43 y.o. female admitted on 5/28/2019 with Arthritis of left hip [M16.12]  S/P total hip arthroplasty [Z96.649]. Principal problem is Breast cancer metastasized to bone, left (CMS/HCC) (Formerly [facility]).    Tatum has a past medical history of Anemia; Breast cancer (CMS/HCC) ([facility]ly [facility]) (2007); Nausea; and Neuropathy.  Concluded session with pt sitting in bed side chair with RN remote in hand.          Therapy Pain/Vitals - 05/29/19 1222        Pain/Comfort/Sleep    Pain Charting Type Pain Assessment    Presence of Pain complains of pain/discomfort    Preferred Pain Scale number (Numeric Rating Pain Scale)    Pain Body Location - Side Left    Pain Body Location - Orientation posterior    Pain Body Location hip    Pain Rating (0-10): Rest 7    Pain Rating (0-10): Activity 8    Pain Management Interventions position adjusted          Prior Living Environment      Most Recent Value   Lives With  child(oscar), adult, spouse   Living Arrangements  house   Living Environment Comment  3SH, 7 REKHA, bed and bath 2nd floor, tub/shower   Number of Stairs, Main Entrance  seven   Stair Railings, Main Entrance  railings safe and in good condition   Equipment Currently Used at Home  cane, quad   Stairs, Within Home, Primary  13   Stair Railings, Within Home, Primary  railings safe and in good condition          Prior Level of Function      Most Recent Value   Ambulation  assistive equipment   Transferring  assistive equipment   Toileting  assistive equipment   Bathing  assistive person   Dressing  assistive person   Eating  independent   Communication  understands/communicates without difficulty   Swallowing  swallows foods/liquids without difficulty   Equipment Currently Used at Home  cane, quad   Prior Functional Level Comment  Pt initially reports I with ADLs and mob with SPC PTA, later in eval states  she needed assist with dressing and sometimes needing assist with bed mob/xfers                PT Evaluation - 05/29/19 1216        Session Details    Document Type initial evaluation    Mode of Treatment individual therapy;physical therapy       Time Calculation    Start Time 1050    Stop Time 1104    Time Calculation (min) 14 min       General Information    Patient Profile Reviewed? yes    Onset of Illness/Injury or Date of Surgery 05/28/19    Referring Physician mack    Existing Precautions/Restrictions fall;weight bearing       Weight Bearing Status    Left LE Weight Bearing Status partial weight bearing   50%       Sensory    Sensory General Assessment no sensation deficits identified       Range of Motion (ROM)    General Range of Motion no range of motion deficits identified       Manual Muscle Testing (MMT)    General MMT Assessment no strength deficits identified       Bed Mobility    [place], Supine to Sit moderate assist (50% patient effort)       Bed to Chair Transfer    [place], Bed to Chair minimum assist (75% patient effort)       Sit to Stand Transfer    [place], Sit to Stand Transfer minimum assist (75% patient effort)       Stand to Sit Transfer    [place], Stand to Sit Transfer minimum assist (75% patient effort)       Gait Training    [place], Gait minimum assist (75% or more patient effort)    Assistive Device walker, front-wheeled    Distance in Feet 15 feet    Gait Pattern Utilized step-to    Gait Deviations Identified decreased gait speed;decreased jannie;antalgic    Maintains Weight Bearing Status able to maintain weight bearing status       Stairs Training    [place], Stairs not tested       AM-PAC (TM) - Mobility (Current Function)    Turning from your back to your side while in a flat bed without using bedrails? 3 - A Little    Moving from lying on your back to sitting on the side of a flat bed without using bedrails? 3 - A Little    Moving to and  from a bed to a chair? 3 - A Little    Standing up from a chair using your arms? 3 - A Little    To walk in a hospital room? 3 - A Little    Climbing 3-5 steps with a railing? 3 - A Little    AM-PAC (TM) Mobility Score 18       PT Clinical Impression    Patient's Goals For Discharge return home    Plan For Care Reviewed: Physical Therapy patient voices agreement with PT plan for care    Impairments Found (PT Eval) gait, locomotion, and balance    Rehab Potential/Prognosis good, to achieve stated therapy goals    PT Frequency of Treatment 5-7 times per week    Problem List decreased strength;decreased ROM;impaired balance;pain    Anticipated Equipment Needs at Discharge bedside commode;front wheeled walker    PT Recommended Discharge Disposition home with home health    Daily Outcome Statement Pt requires A for all mobility tasks.  Needs better pain control.  Also, needs to be able to manage stairs before returning home.  Will trial crutches on stairs.           Pain Assessment/Intervention  Pain Charting Type: Pain Assessment             Education provided this session. See the Patient Education summary report for full details.    PT Care Plan Goals      Most Recent Value   Stair Goal, PT   PT STG: Stairs  supervision required   PT STG: Number of Stairs  13   PT STG Assistive Device: Stairs  crutches, axillary      PT Care Plan Goals      Most Recent Value   Bed Mobility Goal   Date Goal Established: Bed Mobility  05/29/19   Time to Achieve Goal: Bed Mobility  3 days   Goal Activity: Bed Mobility  all bed mobility activities   Level of [place] Goal: Bed Mobility  independent   Goal Outcome: Bed Mobility  goal ongoing   Gait Goal   Date Goal Established: Gait Training  05/29/19   Time to Achieve Goal: Gait Training  3 days   Level of [place]  modified independence   Assistive Device: Gait Training  walker, front-wheeled   Distance Goal: Gait Training (feet)  50 feet   Goal Outcome: Gait Training  goal  ongoing   Transfer Goal   Date Goal Established: Transfer Training  05/29/19   Time to Achieve Goal: Transfer Training  3 days   Goal Activity: Transfer Training  all transfers   Level of [place] Goal: Transfer Training  modified independence   Assistive Device: Transfer Training  walker, front-wheeled   Goal Outcome: Transfer Training  goal ongoing

## 2019-05-29 NOTE — PLAN OF CARE
Problem: Patient Care Overview  Goal: Plan of Care Review  Outcome: Ongoing (interventions implemented as appropriate)   05/29/19 2172   Coping/Psychosocial   Plan Of Care Reviewed With patient   Plan of Care Review   Progress no change   Outcome Summary pt afraid of getting andrade out this am . OOB with therapy dressing dry intact drain emptied

## 2019-05-29 NOTE — PROGRESS NOTES
Patient: Tatum Dhaliwal   MRN: [id number]   : 1975       Daily Progress Note  (LOS: 1 days) 1 Day Post-Op s/p Procedure(s):  Left Total Hip Arthroplasty with Gap Ring and Constraint Cup     Subjective     43 y.o. y/o female s/p Procedure(s):  Left Total Hip Arthroplasty with Gap Ring and Constraint Cup.     Patient reports pain controlled with current pain regimen. Admits to tingling in right hand that was present preop for 2 months and has worsened recently. Requesting andrade to be removed at dinner time as currently very painful/difficult for pt to get OOB to use commode. Denies chest pain, shortness of breath. Denies nausea or vomiting. Admits to flatus but no BM. Voiding without difficulties. Tolerating diet. OOB with PT today. Denies fever or chills.     Patient Active Problem List   Diagnosis   • Preop examination   • Primary osteoarthritis of left hip   • Breast cancer metastasized to bone, left (CMS/HCC) (HCC)   • S/P total hip arthroplasty   • Iron deficiency anemia   • Class 1 obesity due to excess calories without serious comorbidity with body mass index (BMI) of 32.0 to 32.9 in adult   • Leukemoid reaction       Objective     LAST VITALS:    Vitals:    19 0430 19 0827 19 0930 19 1014   BP: 121/61 (!) 110/53 (!) 107/59 (!) 112/53   BP Location: Left upper arm      Patient Position: Lying      Pulse: 92 95 94 (!) 102   Resp: 16 16     Temp: 37 °C (98.6 °F) 36.9 °C (98.4 °F)     TempSrc: Oral      SpO2: 96% 97% 94% 95%   Weight:       Height:           Temp (24hrs), Av °C (98.6 °F), Min:36.8 °C (98.2 °F), Max:37.4 °C (99.4 °F)        Intake/Output Summary (Last 24 hours) at 19 1556  Last data filed at 19 1400   Gross per 24 hour   Intake             2300 ml   Output             2065 ml   Net              235 ml      I/O this shift:  In: 1643.8 [I.V.:1643.8]  Out: 450 [Urine:350; Drains:100]    Allergies: Morphine and Amoxicillin       PHYSICAL EXAM:    2  Mepilex silver dressings: clean, dry and intact with expected postop edema and ecchymosis. + 5/5 DF/PF/EHL bilateral. +NVI with sensation intact. Distal Pulses: 2+ bilateral,Calves: soft, non tender bilateral      LABS:       Results from last 7 days  Lab Units 05/29/19  0801   WBC K/uL 13.16*   HEMOGLOBIN g/dL 9.2*   HEMATOCRIT % 29.1*   PLATELETS K/uL 193   SODIUM mEQ/L 139   POTASSIUM mEQ/L 4.2   CHLORIDE mEQ/L 109   CO2 mEQ/L 23   BUN mg/dL 5*   CREATININE mg/dL 0.5*   GLUCOSE mg/dL 151*   CALCIUM mg/dL 8.2*         Assessment/Plan     43 y.o. y/o female s/p Procedure(s):  Left Total Hip Arthroplasty with Gap Ring and Constraint Cup     1 Day Post-Op     1. DVT prophylaxis: Asprin 325 mg po BID for 6 weeks and bilateral SCD  2. PT/OT: 50% WB on Left LE with strict hip precautions. Abductor pillow while in bed.   3. Incentive spirometer  4. Pain management: Scheduled: tylenol, celebrex, toradol PRN: oxycodone, tramadol, IV dilaudid   5. Bowel regimen: senokot, colace, miralax  6.  Medicine: appreciate recommendations   7. Expected post op Leukocytosis: Monitor. Pt is afebrile and asymptomatic. Most likely elevated secondary to stress of surgery and/ or decadron given in OR.   8. Worsening tingling in right hand: this was d/w Dr. Ugarte and Dr. Cortez, pt's oncologist at [facility]. Per Dr. Ugarte - recommend MRI of cervical spine with and without contrast. Per Dr. Cortez - recommend MRI of brain with contrast    Dispo: D/C home vs rehab when stable per PT/OT/Medicine    GAMA Devi  5/29/2019  3:56 PM

## 2019-05-29 NOTE — PROGRESS NOTES
S: Patient seen and examined at bedside.  Patient reports no acute events overnight.  Patient states her pain has been well controlled with oxycodone and Tylenol.  Patient denies fevers, chills, chest pain or shortness of breath.  Patient states she is hopeful to be out of bed walking today.     O:  PE:  LLE  General: AAOx3, NAD, VSS  Dressing clean, dry and intact  TAYLOR drain intact, functioning with 20cc sanguinous output  Compartments soft and compressible  Motor intact FHL/EHL/TA/GSC/Q/HS  SILT DP/SP/T/S/S  2+DP/PT pulses  BCR  Foot WWP      A/P: 43F s/p L PALOMA DOS: 5/28/19 with Dr. Ugarte   -Ancef x 24H per SCIP protocol  -ASA 325mg BID for DVT ppx  -50% WB LLE  -DC [place] today 5/29  -Posterior hip precautions: No adduction, internal rotation, or extreme hip flexion  -PT/OT  -Multimodal pain control  -AM Labs pending

## 2019-05-29 NOTE — PLAN OF CARE
Problem: Patient Care Overview  Goal: Plan of Care Review  Outcome: Ongoing (interventions implemented as appropriate)   05/29/19 1223   Coping/Psychosocial   Plan Of Care Reviewed With patient   Plan of Care Review   Progress no change   Outcome Summary mobility limited by pain and weakness     Goal: Individualization & Mutuality  Outcome: Ongoing (interventions implemented as appropriate)      Problem: Hip Arthroplasty (Total, Partial) (Adult)  Goal: Signs and Symptoms of Listed Potential Problems Will be Absent, Minimized or Managed (Hip Arthroplasty)  Outcome: Ongoing (interventions implemented as appropriate)      Problem: Fall Risk (Adult)  Goal: Identify Related Risk Factors and Signs and Symptoms  Outcome: Ongoing (interventions implemented as appropriate)    Goal: Absence of Falls  Outcome: Ongoing (interventions implemented as appropriate)      Problem: Acute Therapy Services Goal & Intervention Plan  Goal: Bed Mobility Goal  Outcome: Ongoing (interventions implemented as appropriate)    Goal: Gait Training Goal  Outcome: Ongoing (interventions implemented as appropriate)    Goal: Stairs Goal  Outcome: Ongoing (interventions implemented as appropriate)    Goal: Transfer Training Goal  Outcome: Ongoing (interventions implemented as appropriate)

## 2019-05-30 ENCOUNTER — APPOINTMENT (INPATIENT)
Dept: RADIOLOGY | Facility: HOSPITAL | Age: 44
DRG: 470 | End: 2019-05-30
Attending: PHYSICIAN ASSISTANT
Payer: COMMERCIAL

## 2019-05-30 PROBLEM — M16.12 ARTHRITIS OF LEFT HIP: Status: ACTIVE | Noted: 2019-05-30

## 2019-05-30 LAB
ANION GAP SERPL CALC-SCNC: 8 MEQ/L (ref 3–15)
BUN SERPL-MCNC: 9 MG/DL (ref 8–20)
CALCIUM SERPL-MCNC: 8.3 MG/DL (ref 8.9–10.3)
CASE RPRT: NORMAL
CHLORIDE SERPL-SCNC: 110 MEQ/L (ref 98–109)
CLINICAL INFO: NORMAL
CO2 SERPL-SCNC: 24 MEQ/L (ref 22–32)
CREAT SERPL-MCNC: 0.5 MG/DL
CROSSMATCH: NORMAL
ERYTHROCYTE [DISTWIDTH] IN BLOOD BY AUTOMATED COUNT: 17.2 % (ref 11.7–14.4)
GFR SERPL CREATININE-BSD FRML MDRD: >60 ML/MIN/1.73M*2
GLUCOSE SERPL-MCNC: 114 MG/DL (ref 70–99)
HCT VFR BLDCO AUTO: 26.4 %
HGB BLD-MCNC: 8.5 G/DL
ISBT CODE: 9500
MCH RBC QN AUTO: 25.9 PG (ref 28–33.2)
MCHC RBC AUTO-ENTMCNC: 32.2 G/DL (ref 32.2–35.5)
MCV RBC AUTO: 80.5 FL (ref 83–98)
PATH REPORT.FINAL DX SPEC: NORMAL
PATH REPORT.GROSS SPEC: NORMAL
PDW BLD AUTO: 10.1 FL (ref 9.4–12.3)
PLATELET # BLD AUTO: 170 K/UL
POTASSIUM SERPL-SCNC: 3.9 MEQ/L (ref 3.6–5.1)
PRODUCT CODE: NORMAL
PRODUCT STATUS: NORMAL
RBC # BLD AUTO: 3.28 M/UL (ref 3.93–5.22)
SODIUM SERPL-SCNC: 142 MEQ/L (ref 136–144)
SPECIMEN EXP DATE BLD: NORMAL
UNIT ABO: NORMAL
UNIT ID: NORMAL
UNIT RH: NEGATIVE
WBC # BLD AUTO: 13.49 K/UL

## 2019-05-30 PROCEDURE — 97535 SELF CARE MNGMENT TRAINING: CPT | Mod: GO

## 2019-05-30 PROCEDURE — 97116 GAIT TRAINING THERAPY: CPT | Mod: GP

## 2019-05-30 PROCEDURE — 36415 COLL VENOUS BLD VENIPUNCTURE: CPT | Performed by: PHYSICIAN ASSISTANT

## 2019-05-30 PROCEDURE — 12000000 HC ROOM AND CARE MED/SURG

## 2019-05-30 PROCEDURE — 97110 THERAPEUTIC EXERCISES: CPT | Mod: GP

## 2019-05-30 PROCEDURE — 82310 ASSAY OF CALCIUM: CPT | Performed by: PHYSICIAN ASSISTANT

## 2019-05-30 PROCEDURE — 63700000 HC SELF-ADMINISTRABLE DRUG: Performed by: PHYSICIAN ASSISTANT

## 2019-05-30 PROCEDURE — 72156 MRI NECK SPINE W/O & W/DYE: CPT

## 2019-05-30 PROCEDURE — 85027 COMPLETE CBC AUTOMATED: CPT | Performed by: PHYSICIAN ASSISTANT

## 2019-05-30 PROCEDURE — A9585 GADOBUTROL INJECTION: HCPCS | Performed by: PHYSICIAN ASSISTANT

## 2019-05-30 PROCEDURE — 97530 THERAPEUTIC ACTIVITIES: CPT | Mod: GP

## 2019-05-30 PROCEDURE — 63600000 HC DRUGS/DETAIL CODE: Performed by: PHYSICIAN ASSISTANT

## 2019-05-30 PROCEDURE — 70553 MRI BRAIN STEM W/O & W/DYE: CPT

## 2019-05-30 RX ORDER — LORAZEPAM 0.5 MG/1
0.5 TABLET ORAL EVERY 8 HOURS PRN
Status: DISCONTINUED | OUTPATIENT
Start: 2019-05-30 | End: 2019-06-01 | Stop reason: HOSPADM

## 2019-05-30 RX ORDER — GADOBUTROL 604.72 MG/ML
0.1 INJECTION INTRAVENOUS
Status: COMPLETED | OUTPATIENT
Start: 2019-05-30 | End: 2019-05-30

## 2019-05-30 RX ADMIN — OXYCODONE HYDROCHLORIDE 10 MG: 5 TABLET ORAL at 16:39

## 2019-05-30 RX ADMIN — SENNOSIDES AND DOCUSATE SODIUM 1 TABLET: 8.6; 5 TABLET ORAL at 20:45

## 2019-05-30 RX ADMIN — ONDANSETRON 4 MG: 4 TABLET, ORALLY DISINTEGRATING ORAL at 06:25

## 2019-05-30 RX ADMIN — GABAPENTIN 300 MG: 300 CAPSULE ORAL at 10:26

## 2019-05-30 RX ADMIN — KETOROLAC TROMETHAMINE 7.5 MG: 15 INJECTION, SOLUTION INTRAMUSCULAR; INTRAVENOUS at 00:40

## 2019-05-30 RX ADMIN — GABAPENTIN 300 MG: 300 CAPSULE ORAL at 20:45

## 2019-05-30 RX ADMIN — LORAZEPAM 0.5 MG: 0.5 TABLET ORAL at 12:42

## 2019-05-30 RX ADMIN — ACETAMINOPHEN 975 MG: 325 TABLET ORAL at 16:40

## 2019-05-30 RX ADMIN — ASPIRIN 325 MG: 325 TABLET ORAL at 20:45

## 2019-05-30 RX ADMIN — GADOBUTROL 9 MMOL: 604.72 INJECTION INTRAVENOUS at 14:00

## 2019-05-30 RX ADMIN — MULTIPLE VITAMINS W/ MINERALS TAB 1 TABLET: TAB at 18:18

## 2019-05-30 RX ADMIN — ASPIRIN 325 MG: 325 TABLET ORAL at 10:25

## 2019-05-30 RX ADMIN — FERROUS SULFATE TAB 325 MG (65 MG ELEMENTAL FE) 325 MG: 325 (65 FE) TAB at 10:25

## 2019-05-30 RX ADMIN — PANTOPRAZOLE SODIUM 40 MG: 40 TABLET, DELAYED RELEASE ORAL at 00:40

## 2019-05-30 RX ADMIN — OXYCODONE HYDROCHLORIDE 10 MG: 5 TABLET ORAL at 06:25

## 2019-05-30 RX ADMIN — KETOROLAC TROMETHAMINE 7.5 MG: 15 INJECTION, SOLUTION INTRAMUSCULAR; INTRAVENOUS at 06:25

## 2019-05-30 RX ADMIN — CELECOXIB 200 MG: 200 CAPSULE ORAL at 10:26

## 2019-05-30 RX ADMIN — ACETAMINOPHEN 975 MG: 325 TABLET ORAL at 10:25

## 2019-05-30 RX ADMIN — EXEMESTANE 25 MG: 25 TABLET ORAL at 10:28

## 2019-05-30 RX ADMIN — OXYCODONE HYDROCHLORIDE 10 MG: 5 TABLET ORAL at 00:40

## 2019-05-30 RX ADMIN — PANTOPRAZOLE SODIUM 40 MG: 40 TABLET, DELAYED RELEASE ORAL at 10:25

## 2019-05-30 RX ADMIN — OXYCODONE HYDROCHLORIDE 10 MG: 5 TABLET ORAL at 10:28

## 2019-05-30 RX ADMIN — ACETAMINOPHEN 975 MG: 325 TABLET ORAL at 00:40

## 2019-05-30 ASSESSMENT — COGNITIVE AND FUNCTIONAL STATUS - GENERAL
STANDING UP FROM CHAIR USING ARMS: 4 - NONE
HELP NEEDED FOR BATHING: 2 - A LOT
EATING MEALS: 4 - NONE
CLIMB 3 TO 5 STEPS WITH RAILING: 3 - A LITTLE
MOVING TO AND FROM BED TO CHAIR: 4 - NONE
DRESSING REGULAR UPPER BODY CLOTHING: 3 - A LITTLE
DRESSING REGULAR LOWER BODY CLOTHING: 2 - A LOT
HELP NEEDED FOR PERSONAL GROOMING: 4 - NONE
TOILETING: 2 - A LOT
WALKING IN HOSPITAL ROOM: 3 - A LITTLE

## 2019-05-30 NOTE — PROGRESS NOTES
"Patient: Tatum Dhaliwal  Location: [facility] [address] [address]  MRN: [id number]  Today's date: 5/30/2019     Patient performed supine isometric program: ankle pumps, gluteal sets, quadriceps sets.  All 1 x 10.   Pt instructed to breathe with contractions.  Pt instructed to perform above HEP at will.    Patient performed supine-->sit and sit<-->stand with close supervision.  Pt required assist of LLE during sit-->supine.   Verbal cues to maintain hip precautions.    Patient ambulated 63 feet with RW and close supervision, step-to pattern.  Pt reluctant to place L heel on floor during stance.  Pt apparently compliant with 50% weight bearing restriction.  Verbal cues for pointed use of RW during L stance.    Pt did report \"popping\" sensation and movement in L hip during last few steps of ambulation.  Pt denied associated pain with this sensation and reported no increased pain overall upon sitting.  RN made aware and will contact ortho to report.    Patient left supine in bed, head of bed at 30 degrees, bed alarm activated, all stated needs met.  Adduction pillow and SCDs placed.  RN aware.    Hospital Course  Tatum is a 43 y.o. female admitted on 5/28/2019 with Arthritis of left hip [M16.12]  S/P total hip arthroplasty [Z96.649]. Principal problem is Breast cancer metastasized to bone, left (CMS/HCC) ([facility]).    Tatum has a past medical history of Anemia; Breast cancer (CMS/HCC) ([facility]) (2007); Nausea; and Neuropathy.     s/p   1.  Left total hip arthroplasty using Melissa constrained hip system.  2.  Open reduction and internal fixation of left ilium with gap cup plating system.  3.  Neurolysis and visualization of the sciatic nerve.          Therapy Pain/Vitals     Row Name 05/30/19 1247 05/30/19 1611       Pain/Comfort/Sleep    Pain Charting Type  -- Pain Reassessment    Presence of Pain complains of pain/discomfort  --    Preferred Pain Scale number (Numeric Rating Pain Scale)  --    Pain Body Location " - Side Left Left    Pain Body Location hip hip    Pain Rating (0-10): Rest 5 6    Pain Rating (0-10): Activity 5 8    Pain Management Interventions position adjusted premedicated for activity       Vital Signs    Pulse  -- 96    Heart Rate Source  -- Other (Comment)   POX    SpO2  -- 97 %    Patient Activity  -- At rest    Oxygen Therapy  -- None (Room air)    BP  -- (!)  113/58    MAP (mmHg)  -- 81 mmHg    BP Location  -- Right upper arm    BP Method  -- Automatic    Patient Position  -- Lying    Row Name 05/30/19 1702          Vital Signs    Pulse (!)  102     Heart Rate Source Other (Comment)   POX     SpO2 95 %     Patient Activity Walking     Oxygen Therapy None (Room air)     BP (!)  114/57     MAP (mmHg) 80 mmHg     BP Location Right upper arm     BP Method Automatic     Patient Position Lying           Prior Living Environment      Most Recent Value   Lives With  child(oscar), adult, spouse   Living Arrangements  house   Living Environment Comment  3SH, 7 REKHA, bed and bath 2nd floor, tub/shower   Number of Stairs, Main Entrance  seven   Stair Railings, Main Entrance  railings safe and in good condition   Equipment Currently Used at Home  cane, quad   Stairs, Within Home, Primary  13   Stair Railings, Within Home, Primary  railings safe and in good condition          Prior Level of Function      Most Recent Value   Ambulation  assistive equipment   Transferring  assistive equipment   Toileting  assistive equipment   Bathing  assistive person   Dressing  assistive person   Eating  independent   Communication  understands/communicates without difficulty   Swallowing  swallows foods/liquids without difficulty   Equipment Currently Used at Home  cane, quad   Prior Functional Level Comment  Pt initially reports I with ADLs and mob with SPC PTA, later in eval states she needed assist with dressing and sometimes needing assist with bed mob/xfers                PT Treatment Summary - 05/30/19 3085        Session  Details    Document Type daily treatment    Mode of Treatment individual therapy;physical therapy       Time Calculation    Start Time 1615    Stop Time 1706    Time Calculation (min) 51 min       General Information    Patient Profile Reviewed? yes    Existing Precautions/Restrictions fall;weight bearing       Weight Bearing Status    Left LE Weight Bearing Status partial weight bearing;other (see comments)   50% WB LLE       Bed Mobility    [place], Roll Left modified independence    [place], Roll Right modified independence    [place], Supine to Sit modified independence    [place], Sit to Supine minimum assist (75% patient effort)       Sit to Stand Transfer    [place], Sit to Stand Transfer modified independence       Stand to Sit Transfer    [place], Stand to Sit Transfer modified independence       Gait Training    [place], Gait close supervision    Assistive Device walker, front-wheeled    Distance in Feet 63 feet    Comment see note       Stairs Training    [place], Stairs not tested       LE Supine Therapeutic Exercise    Comment see note       AM-PAC (TM) - Mobility (Current Function)    Turning from your back to your side while in a flat bed without using bedrails? 4 - None    Moving from lying on your back to sitting on the side of a flat bed without using bedrails? 4 - None    Moving to and from a bed to a chair? 4 - None    Standing up from a chair using your arms? 4 - None    To walk in a hospital room? 3 - A Little    Climbing 3-5 steps with a railing? 3 - A Little    AM-PAC (TM) Mobility Score 22       PT Clinical Impression    Plan For Care Reviewed: Physical Therapy patient voices agreement with PT plan for care    Functional Limitations in Following Categories (PT Eval) home management;work;community/leisure    Rehab Potential/Prognosis good, to achieve stated therapy goals    PT Frequency of Treatment 5-7 times per week    Activity Limitations Related to  Problem List functional mobility not performed adequately or safely for household activity;functional mobility not performed adequately or safely for community activity    Anticipated Equipment Needs at Discharge bedside commode;front wheeled walker    PT Recommended Discharge Disposition home with home health;other (see comments)   Pt reports supportive family to assist    Daily Outcome Statement Pt independent with supine-->sit, but did require assistance for sit-->supine.  Pt required close supervision for all other mobility.  Pt ambulated brief distance in hallway, apparently compliant with 50% WB restriction LLE.  Anticipate return to home with supportive family.  Home PT will address decreased endurance and provide home safety check.          Pain Assessment/Intervention  Pain Charting Type: Pain Reassessment             Education provided this session. See the Patient Education summary report for full details.    Patient educated in weight-bearing restriction of 50% LLE.  Patient demonstrated good understanding.    Patient educated in total hip precautions, L posterior.  Patient verbalized and demonstrated good understanding.        PT Care Plan Goals      Most Recent Value   Stair Goal, PT   PT STG: Stairs  supervision required   PT STG: Number of Stairs  13   PT STG Assistive Device: Stairs  crutches, axillary      PT Care Plan Goals      Most Recent Value   Bed Mobility Goal   Date Goal Established: Bed Mobility  05/29/19   Time to Achieve Goal: Bed Mobility  3 days   Goal Activity: Bed Mobility  all bed mobility activities   Level of [place] Goal: Bed Mobility  independent   Goal Outcome: Bed Mobility  goal ongoing   Gait Goal   Date Goal Established: Gait Training  05/29/19   Time to Achieve Goal: Gait Training  3 days   Level of [place]  modified independence   Assistive Device: Gait Training  walker, front-wheeled   Distance Goal: Gait Training (feet)  50 feet   Goal Outcome: Gait Training   goal ongoing   Transfer Goal   Date Goal Established: Transfer Training  05/29/19   Time to Achieve Goal: Transfer Training  3 days   Goal Activity: Transfer Training  all transfers   Level of [place] Goal: Transfer Training  modified independence   Assistive Device: Transfer Training  walker, front-wheeled   Goal Outcome: Transfer Training  goal ongoing

## 2019-05-30 NOTE — PROGRESS NOTES
S: Patient seen and examined at bedside.  Patient reports no acute events overnight.  Patient states her pain has been well controlled and is improving.  Patient denies fevers, chills, chest pain or shortness of breath.  Patient reports no new complaints at this time.     O:  PE:  LLE  General: AAOx3, NAD, VSS  Dressing clean, dry and intact  TAYLOR drain intact, functioning with 100cc sanguinous output  Compartments soft and compressible  Motor intact FHL/EHL/TA/GSC/Q/HS  SILT DP/SP/T/S/S  2+DP/PT pulses  BCR  Foot WWP      A/P: 43F s/p L PALOMA DOS: 5/28/19 with Dr. Ugarte   -Ancef x 24H per SCIP protocol, complete  -ASA 325mg BID for DVT ppx  -50% WB LLE  -Maintain TAYLOR drain  -Posterior hip precautions: No adduction, internal rotation, or extreme hip flexion  -PT/OT  -Multimodal pain control  -AM Labs pending  -Dispo: anticipate DC home with home health care and home PT, likely stable today 5/30 pending PT eval on stairs

## 2019-05-30 NOTE — PLAN OF CARE
Problem: Patient Care Overview  Goal: Plan of Care Review  Outcome: Ongoing (interventions implemented as appropriate)   05/30/19 7140   Coping/Psychosocial   Plan Of Care Reviewed With patient   Plan of Care Review   Progress progress toward functional goals as expected   Outcome Summary Pt progressing toward goals set by PT in POC.     Goal: Individualization & Mutuality  Outcome: Ongoing (interventions implemented as appropriate)      Problem: Hip Arthroplasty (Total, Partial) (Adult)  Goal: Signs and Symptoms of Listed Potential Problems Will be Absent, Minimized or Managed (Hip Arthroplasty)  Outcome: Ongoing (interventions implemented as appropriate)

## 2019-05-30 NOTE — PROGRESS NOTES
Patient: Tatum Dhaliwal   MRN: [id number]   : 1975       Daily Progress Note  (LOS: 2 days) 2 Day Post-Op s/p Procedure(s):  Left Total Hip Arthroplasty with Gap Ring and Constraint Cup     Subjective     43 y.o. y/o female s/p Procedure(s):  Left Total Hip Arthroplasty with Gap Ring and Constraint Cup.     Patient reports pain controlled with current pain regimen. Admits to tingling in right hand that was present preop for 2 months and has worsened recently.Pt reports less  tingling in right hand today. Holm catheter removed last night-voiding without difficulties. Denies chest pain, shortness of breath. Denies nausea or vomiting. Admits to flatus but no BM. Voiding without difficulties. Tolerating diet. OOB with PT today. Denies fever or chills.     Patient Active Problem List   Diagnosis   • Preop examination   • Primary osteoarthritis of left hip   • Breast cancer metastasized to bone, left (CMS/HCC) (HCC)   • S/P total hip arthroplasty   • Iron deficiency anemia   • Class 1 obesity due to excess calories without serious comorbidity with body mass index (BMI) of 32.0 to 32.9 in adult   • Leukemoid reaction       Objective     LAST VITALS:    Vitals:    19 2234 19 2300 19 0000 19 0708   BP: (!) 114/54   (!) 117/56   BP Location: Right upper arm   Right upper arm   Patient Position: Sitting   Lying   Pulse: 97  83 95   Resp: 18   18   Temp: 37.1 °C (98.8 °F)   36.7 °C (98.1 °F)   TempSrc: Oral   Oral   SpO2: 97%  97% 95%   Weight:  93 kg (205 lb)     Height:           Temp (24hrs), Av.9 °C (98.5 °F), Min:36.7 °C (98.1 °F), Max:37.1 °C (98.8 °F)        Intake/Output Summary (Last 24 hours) at 19 1200  Last data filed at 19 1123   Gross per 24 hour   Intake                0 ml   Output             1100 ml   Net            -1100 ml      I/O this shift:  In: -   Out: 600 [Urine:425; Drains:175]    Allergies: Morphine and Amoxicillin       PHYSICAL EXAM:    2 Mepilex  silver dressings: clean, dry and intact with expected postop edema and ecchymosis. + 5/5 DF/PF/EHL bilateral. +NVI with sensation intact. Distal Pulses: 2+ bilateral,Calves: soft, non tender bilateral      LABS:       Results from last 7 days  Lab Units 05/30/19  0823   WBC K/uL 13.49*   HEMOGLOBIN g/dL 8.5*   HEMATOCRIT % 26.4*   PLATELETS K/uL 170   SODIUM mEQ/L 142   POTASSIUM mEQ/L 3.9   CHLORIDE mEQ/L 110*   CO2 mEQ/L 24   BUN mg/dL 9   CREATININE mg/dL 0.5*   GLUCOSE mg/dL 114*   CALCIUM mg/dL 8.3*         Assessment/Plan     43 y.o. y/o female s/p Procedure(s):  Left Total Hip Arthroplasty with Gap Ring and Constraint Cup     2 Day Post-Op     1. DVT prophylaxis: Asprin 325 mg po BID for 6 weeks and bilateral SCD  2. PT/OT: 50% WB on Left LE with strict hip precautions. Abductor pillow while in bed.   3. Incentive spirometer  4. Pain management: Scheduled: tylenol, celebrex, toradol PRN: oxycodone, tramadol, IV dilaudid   5. Bowel regimen: senokot, colace, miralax  6.  Medicine: appreciate recommendations   7. Expected post op Leukocytosis: Stable- continue to monitor. Pt is afebrile and asymptomatic. Most likely elevated secondary to stress of surgery and/ or decadron given in OR.   8. Worsening tingling in right hand: Per Dr. Ugarte and Dr. Cortez, pt's oncologist at [facility]. Recommend MRI cervical spine and brain with and without contrast. This will be completed today.   9. TAYLOR drain: output 300 cc today - plan on d/c tomorrow.    Dispo: D/C home vs rehab when stable per PT/OT/Medicine. If pt goes home - will set up home care-home PT and OT    GAMA Devi  5/30/2019  12:00 PM

## 2019-05-30 NOTE — PROGRESS NOTES
Patient: Tatum Dhaliwal  Location: [place] [address]  MRN: [id number]  Today's date: 5/30/2019    Attempted to see patient for therapy. Unable due to patient refused.  Pt politely declined at this time.  Pt just returned to bed w/ lunch.

## 2019-05-30 NOTE — PLAN OF CARE
Problem: Patient Care Overview  Goal: Plan of Care Review  Outcome: Ongoing (interventions implemented as appropriate)   05/30/19 1942   Coping/Psychosocial   Plan Of Care Reviewed With patient   Plan of Care Review   Progress improving       Problem: Pressure Ulcer Risk (Nathan Scale) (Adult,Obstetrics,Pediatric)  Goal: Skin Integrity  Outcome: Ongoing (interventions implemented as appropriate)      Problem: Fall Risk (Adult)  Goal: Absence of Falls  Outcome: Ongoing (interventions implemented as appropriate)      Problem: Pain, Acute (Adult)  Goal: Acceptable Pain Control/Comfort Level  Outcome: Ongoing (interventions implemented as appropriate)

## 2019-05-30 NOTE — PROGRESS NOTES
Patient: Tatum Dhaliwal  Location: [place] [address]  MRN: [id number]  Today's date: 5/30/2019  Left w/ transport.  Hospital Course  Tatum is a 43 y.o. female admitted on 5/28/2019 with Arthritis of left hip [M16.12]  S/P total hip arthroplasty [Z96.649]. Principal problem is Breast cancer metastasized to bone, left (CMS/HCC) ([facility]).    Tatum has a past medical history of Anemia; Breast cancer (CMS/HCC) ([facility]) (2007); Nausea; and Neuropathy.          Therapy Pain/Vitals - 05/30/19 1245        Pain/Comfort/Sleep    Presence of Pain complains of pain/discomfort    Preferred Pain Scale number (Numeric Rating Pain Scale)    Pain Body Location - Side Left    Pain Body Location hip    Pain Rating (0-10): Rest 5    Pain Rating (0-10): Activity 5    Pain Management Interventions position adjusted          Prior Living Environment      Most Recent Value   Lives With  child(oscar), adult, spouse   Living Arrangements  house   Living Environment Comment  3SH, 7 REKHA, bed and bath 2nd floor, tub/shower   Number of Stairs, Main Entrance  seven   Stair Railings, Main Entrance  railings safe and in good condition   Equipment Currently Used at Home  cane, quad   Stairs, Within Home, Primary  13   Stair Railings, Within Home, Primary  railings safe and in good condition          Prior Level of Function      Most Recent Value   Ambulation  assistive equipment   Transferring  assistive equipment   Toileting  assistive equipment   Bathing  assistive person   Dressing  assistive person   Eating  independent   Communication  understands/communicates without difficulty   Swallowing  swallows foods/liquids without difficulty   Equipment Currently Used at Home  cane, quad   Prior Functional Level Comment  Pt initially reports I with ADLs and mob with SPC PTA, later in eval states she needed assist with dressing and sometimes needing assist with bed mob/xfers                OT Treatment Summary - 05/30/19 1547         Session Details    Document Type daily treatment    Mode of Treatment occupational therapy       Time Calculation    Start Time 1247    Stop Time 1311    Time Calculation (min) 24 min       General Information    Patient Profile Reviewed? yes    Existing Precautions/Restrictions fall;weight bearing       Weight Bearing Status    Left LE Weight Bearing Status partial weight bearing   50%       Bed Mobility    [place], Supine to Sit moderate assist (50% patient effort);1 person assist   for LE's    Assistive Device (Bed Mobility) bed rails;head of bed elevated       Transfers    Maintains Weight Bearing Status (Transfers) able to maintain weight bearing status    Comment PWBing Lt LE       Bed to Chair Transfer    Assistive Device walker, front-wheeled       Sit to Stand Transfer    [place], Sit to Stand Transfer minimum assist (75% patient effort);1 person assist    Verbal Cues hand placement    Assistive Device walker, front-wheeled    Comment w/ incr time        Stand to Sit Transfer    [place], Stand to Sit Transfer minimum assist (75% patient effort);1 person assist    Verbal Cues hand placement    Assistive Device walker, front-wheeled       Stand Pivot/Stand Step Transfer    [place], Stand Pivot/Stand Step Transfer --    Verbal Cues --    Assistive Device --       Toilet Transfer    [place], Toilet Transfer minimum assist (75% patient effort);1 person assist    Assistive Device commode chair       Lower Body Dressing    Comment reviewed AE- pt reports does not like hard SA pt ed on soft SA and where to get.       Toileting    Assistive Device commode chair       AM-PAC (TM) - ADL (Current Function)    Putting on and taking off regular lower body clothing? 2 - A Lot    Bathing? 2 - A Lot    Toileting? 2 - A Lot    Putting on/taking off regular upper body clothing? 3 - A Little    How much help for taking care of personal grooming? 4 - None    Eating meals? 4 - None    AM-PAC (TM) ADL  Score 17       OT Clinical Impression    Patient's Goals For Discharge return to all previous roles/activities    Plan For Care Reviewed: Occupational Therapy patient voices agreement with OT plan for care    Rehab Potential/Prognosis: Occupational Therapy good, to achieve stated therapy goals    OT Frequency of Treatment 5 times per week    Anticipated Equipment Needs at Discharge bathing equipment;bedside commode;dressing equipment;shower chair    OT Recommended Discharge Disposition skilled nursing facility;home with assist;home with home health   pending progress    Daily Outcome Statement Limited session 2* pt going for MRI.  Pt ed on AE/DME pt going to get hip kit (soft sock aid).  Will continue to follow for OT needs.  Rec commode if going home.                     Education provided this session. See the Patient Education summary report for full details.         OT Care Plan Goals      Most Recent Value   Bed Mobility Goal   Date Goal Established: Bed Mobility  05/29/19   Time to Achieve Goal: Bed Mobility  3 days   Goal Activity: Bed Mobility  all bed mobility activities   Level of [place] Goal: Bed Mobility  independent   Goal Outcome: Bed Mobility  goal ongoing   Lower Body Dressing Goal   Date Goal Established: Lower Body Dressing  05/29/19   Time to Achieve Goal: Lower Body Dressing  by discharge   Level of [place]  minimum assist (75% or more patient effort)   Adaptive Equipment: Lower Body Dressing  sock-aid, reacher, dressing stick   Goal Outcome: Lower Body Dressing  goal ongoing   Toilet Transfer Goal   Date Goal Established: Toilet Transfer Training  05/29/19   Time to Achieve Goal: Toilet Transfer Training  by discharge   Level of [place] Goal: Toilet Transfer Training  supervision required   Goal Outcome: Toilet Transfer Training  goal ongoing   Transfer Goal   Date Goal Established: Transfer Training  05/29/19   Time to Achieve Goal: Transfer Training  3 days   Goal Activity:  Transfer Training  all transfers   Level of [place] Goal: Transfer Training  modified independence   Assistive Device: Transfer Training  walker, front-wheeled   Goal Outcome: Transfer Training  goal ongoing   Tub-Shower Transfer Goal   Date Goal Established: Tub-Shower Transfer Training  05/29/19   Time to Achieve Goal: Tub-Shower Transfer Training  by discharge   Level of [place] Goal: Tub-Shower Transfer Training  minimum assist (75% or more patient effort)   Goal Outcome: Tub-Shower Transfer Training  goal ongoing

## 2019-05-30 NOTE — PLAN OF CARE
Problem: Patient Care Overview  Goal: Plan of Care Review  Outcome: Ongoing (interventions implemented as appropriate)   05/30/19 6157   Coping/Psychosocial   Plan Of Care Reviewed With patient   Plan of Care Review   Progress progress toward functional goals is gradual   Outcome Summary Progressing twds OT goals.       Problem: Hip Arthroplasty (Total, Partial) (Adult)  Goal: Signs and Symptoms of Listed Potential Problems Will be Absent, Minimized or Managed (Hip Arthroplasty)  Outcome: Ongoing (interventions implemented as appropriate)      Problem: Fall Risk (Adult)  Goal: Absence of Falls  Outcome: Ongoing (interventions implemented as appropriate)

## 2019-05-30 NOTE — PLAN OF CARE
Problem: Patient Care Overview  Goal: Plan of Care Review  Outcome: Ongoing (interventions implemented as appropriate)   05/30/19 1121   Coping/Psychosocial   Plan Of Care Reviewed With patient   Plan of Care Review   Progress improving

## 2019-05-30 NOTE — PROGRESS NOTES
Patient discussed in CPR. She is not medically stable for d/c today, needs MRI of neck and brain. PT recs are for home with [facility], attempting to find [facility] provider which is in network for patient. Potential d/c tomorrow vs Saturday. Patient inquiring about whether she needs an ambulance for transport home. Favio Ding, PT, states patient should be safe for transport in private vehicle. Will continue to follow patient and assist with any skilled discharge needs. -- Seema Shi RN CC [id number]

## 2019-05-30 NOTE — PLAN OF CARE
Problem: Patient Care Overview  Goal: Plan of Care Review  Outcome: Ongoing (interventions implemented as appropriate)   05/29/19 2215 05/30/19 0000   Coping/Psychosocial   Plan Of Care Reviewed With --  patient   Plan of Care Review   Progress improving --        Problem: Pressure Ulcer Risk (Nathan Scale) (Adult,Obstetrics,Pediatric)  Goal: Identify Related Risk Factors and Signs and Symptoms  Outcome: Outcome(s) Achieved Date Met: 05/30/19    Goal: Skin Integrity  Outcome: Ongoing (interventions implemented as appropriate)      Problem: Hip Arthroplasty (Total, Partial) (Adult)  Goal: Signs and Symptoms of Listed Potential Problems Will be Absent, Minimized or Managed (Hip Arthroplasty)  Outcome: Ongoing (interventions implemented as appropriate)    Goal: Anesthesia/Sedation Recovery  Outcome: Outcome(s) Achieved Date Met: 05/30/19      Problem: Fall Risk (Adult)  Goal: Identify Related Risk Factors and Signs and Symptoms  Outcome: Outcome(s) Achieved Date Met: 05/30/19    Goal: Absence of Falls  Outcome: Ongoing (interventions implemented as appropriate)      Problem: Pain, Acute (Adult)  Goal: Identify Related Risk Factors and Signs and Symptoms  Outcome: Outcome(s) Achieved Date Met: 05/30/19    Goal: Acceptable Pain Control/Comfort Level  Outcome: Ongoing (interventions implemented as appropriate)

## 2019-05-30 NOTE — PLAN OF CARE
Problem: Patient Care Overview  Goal: Plan of Care Review  Outcome: Ongoing (interventions implemented as appropriate)   05/29/19 8535   Coping/Psychosocial   Plan Of Care Reviewed With patient   Plan of Care Review   Progress improving       Problem: Pressure Ulcer Risk (Nathan Scale) (Adult,Obstetrics,Pediatric)  Goal: Identify Related Risk Factors and Signs and Symptoms  Outcome: Ongoing (interventions implemented as appropriate)    Goal: Skin Integrity  Outcome: Ongoing (interventions implemented as appropriate)      Problem: Hip Arthroplasty (Total, Partial) (Adult)  Goal: Signs and Symptoms of Listed Potential Problems Will be Absent, Minimized or Managed (Hip Arthroplasty)  Outcome: Ongoing (interventions implemented as appropriate)    Goal: Anesthesia/Sedation Recovery  Outcome: Ongoing (interventions implemented as appropriate)      Problem: Fall Risk (Adult)  Goal: Identify Related Risk Factors and Signs and Symptoms  Outcome: Ongoing (interventions implemented as appropriate)    Goal: Absence of Falls  Outcome: Ongoing (interventions implemented as appropriate)      Problem: Pain, Acute (Adult)  Goal: Identify Related Risk Factors and Signs and Symptoms  Outcome: Ongoing (interventions implemented as appropriate)    Goal: Acceptable Pain Control/Comfort Level  Outcome: Ongoing (interventions implemented as appropriate)

## 2019-05-30 NOTE — PROGRESS NOTES
Patient: Tatum Dhaliwal  Location: [place] [address]  MRN: [id number]  Today's date: 5/30/2019    Attempted to see patient for therapy. Unable due to patient at test or procedure.

## 2019-05-31 LAB
ANION GAP SERPL CALC-SCNC: 5 MEQ/L (ref 3–15)
BUN SERPL-MCNC: 5 MG/DL (ref 8–20)
CALCIUM SERPL-MCNC: 8.3 MG/DL (ref 8.9–10.3)
CHLORIDE SERPL-SCNC: 110 MEQ/L (ref 98–109)
CO2 SERPL-SCNC: 27 MEQ/L (ref 22–32)
CREAT SERPL-MCNC: 0.5 MG/DL
ERYTHROCYTE [DISTWIDTH] IN BLOOD BY AUTOMATED COUNT: 17.5 % (ref 11.7–14.4)
GFR SERPL CREATININE-BSD FRML MDRD: >60 ML/MIN/1.73M*2
GLUCOSE SERPL-MCNC: 104 MG/DL (ref 70–99)
HCT VFR BLDCO AUTO: 25.3 %
HGB BLD-MCNC: 8.1 G/DL
MCH RBC QN AUTO: 25.8 PG (ref 28–33.2)
MCHC RBC AUTO-ENTMCNC: 32 G/DL (ref 32.2–35.5)
MCV RBC AUTO: 80.6 FL (ref 83–98)
PDW BLD AUTO: 9.3 FL (ref 9.4–12.3)
PLATELET # BLD AUTO: 171 K/UL
POTASSIUM SERPL-SCNC: 3.7 MEQ/L (ref 3.6–5.1)
RBC # BLD AUTO: 3.14 M/UL (ref 3.93–5.22)
SODIUM SERPL-SCNC: 142 MEQ/L (ref 136–144)
WBC # BLD AUTO: 11.11 K/UL

## 2019-05-31 PROCEDURE — 36415 COLL VENOUS BLD VENIPUNCTURE: CPT | Performed by: PHYSICIAN ASSISTANT

## 2019-05-31 PROCEDURE — 12000000 HC ROOM AND CARE MED/SURG

## 2019-05-31 PROCEDURE — 97116 GAIT TRAINING THERAPY: CPT | Mod: GP

## 2019-05-31 PROCEDURE — 97530 THERAPEUTIC ACTIVITIES: CPT | Mod: GP

## 2019-05-31 PROCEDURE — 80048 BASIC METABOLIC PNL TOTAL CA: CPT | Performed by: PHYSICIAN ASSISTANT

## 2019-05-31 PROCEDURE — 63700000 HC SELF-ADMINISTRABLE DRUG: Performed by: PHYSICIAN ASSISTANT

## 2019-05-31 PROCEDURE — 85027 COMPLETE CBC AUTOMATED: CPT | Performed by: PHYSICIAN ASSISTANT

## 2019-05-31 RX ORDER — AMOXICILLIN 250 MG
1 CAPSULE ORAL 2 TIMES DAILY PRN
Start: 2019-05-31 | End: 2019-06-30

## 2019-05-31 RX ORDER — POLYETHYLENE GLYCOL 3350 17 G/17G
17 POWDER, FOR SOLUTION ORAL DAILY PRN
Start: 2019-05-31 | End: 2019-06-30

## 2019-05-31 RX ORDER — ASPIRIN 81 MG/1
81 TABLET ORAL 2 TIMES DAILY
Status: DISCONTINUED | OUTPATIENT
Start: 2019-05-31 | End: 2019-05-31

## 2019-05-31 RX ORDER — ACETAMINOPHEN 500 MG
500 TABLET ORAL EVERY 6 HOURS PRN
Start: 2019-05-31 | End: 2019-06-30

## 2019-05-31 RX ORDER — ASPIRIN 81 MG/1
81 TABLET ORAL 2 TIMES DAILY
Qty: 60 TABLET | Refills: 0
Start: 2019-05-31 | End: 2019-07-12

## 2019-05-31 RX ORDER — ASPIRIN 81 MG/1
81 TABLET ORAL 2 TIMES DAILY
Status: DISCONTINUED | OUTPATIENT
Start: 2019-05-31 | End: 2019-06-01 | Stop reason: HOSPADM

## 2019-05-31 RX ORDER — OXYCODONE HYDROCHLORIDE 5 MG/1
5-10 TABLET ORAL EVERY 4 HOURS PRN
Refills: 0
Start: 2019-05-31 | End: 2019-06-05

## 2019-05-31 RX ADMIN — SENNOSIDES AND DOCUSATE SODIUM 1 TABLET: 8.6; 5 TABLET ORAL at 08:24

## 2019-05-31 RX ADMIN — ACETAMINOPHEN 975 MG: 325 TABLET ORAL at 02:17

## 2019-05-31 RX ADMIN — SENNOSIDES AND DOCUSATE SODIUM 1 TABLET: 8.6; 5 TABLET ORAL at 20:39

## 2019-05-31 RX ADMIN — OXYCODONE HYDROCHLORIDE 10 MG: 5 TABLET ORAL at 02:17

## 2019-05-31 RX ADMIN — GABAPENTIN 300 MG: 300 CAPSULE ORAL at 20:39

## 2019-05-31 RX ADMIN — ASPIRIN 81 MG: 81 TABLET, COATED ORAL at 20:39

## 2019-05-31 RX ADMIN — GABAPENTIN 300 MG: 300 CAPSULE ORAL at 08:23

## 2019-05-31 RX ADMIN — OXYCODONE HYDROCHLORIDE 10 MG: 5 TABLET ORAL at 05:19

## 2019-05-31 RX ADMIN — FERROUS SULFATE TAB 325 MG (65 MG ELEMENTAL FE) 325 MG: 325 (65 FE) TAB at 08:24

## 2019-05-31 RX ADMIN — OXYCODONE HYDROCHLORIDE 10 MG: 5 TABLET ORAL at 20:39

## 2019-05-31 RX ADMIN — ASPIRIN 325 MG: 325 TABLET ORAL at 08:23

## 2019-05-31 RX ADMIN — PANTOPRAZOLE SODIUM 40 MG: 40 TABLET, DELAYED RELEASE ORAL at 08:24

## 2019-05-31 RX ADMIN — MULTIPLE VITAMINS W/ MINERALS TAB 1 TABLET: TAB at 17:31

## 2019-05-31 RX ADMIN — EXEMESTANE 25 MG: 25 TABLET ORAL at 08:24

## 2019-05-31 RX ADMIN — ACETAMINOPHEN 975 MG: 325 TABLET ORAL at 17:31

## 2019-05-31 RX ADMIN — ACETAMINOPHEN 975 MG: 325 TABLET ORAL at 08:24

## 2019-05-31 RX ADMIN — CELECOXIB 200 MG: 200 CAPSULE ORAL at 08:23

## 2019-05-31 RX ADMIN — OXYCODONE HYDROCHLORIDE 10 MG: 5 TABLET ORAL at 11:21

## 2019-05-31 RX ADMIN — ONDANSETRON 4 MG: 4 TABLET, ORALLY DISINTEGRATING ORAL at 05:19

## 2019-05-31 RX ADMIN — OXYCODONE HYDROCHLORIDE 10 MG: 5 TABLET ORAL at 23:42

## 2019-05-31 ASSESSMENT — COGNITIVE AND FUNCTIONAL STATUS - GENERAL
CLIMB 3 TO 5 STEPS WITH RAILING: 3 - A LITTLE
STANDING UP FROM CHAIR USING ARMS: 4 - NONE
WALKING IN HOSPITAL ROOM: 3 - A LITTLE
MOVING TO AND FROM BED TO CHAIR: 4 - NONE

## 2019-05-31 NOTE — PROGRESS NOTES
S: Patient seen and examined at bedside.  Patient reports no acute events overnight.  Patient states her pain has been less well controlled overnight as she was not woken up to be given her pain medicine. Patient denies fevers, chills, chest pain or shortness of breath.  Patient states she was able to walk around the unit yesterday with rolling walker during physical therapy, however did not attempt stairs. Patient reports no new complaints at this time.     O:  PE:  LLE  General: AAOx3, NAD, VSS  Dressing clean, dry and intact  TAYLOR drain intact, functioning with 25cc sanguinous output  Compartments soft and compressible  Motor intact FHL/EHL/TA/GSC/Q/HS  SILT DP/SP/T/S/S  2+DP/PT pulses  BCR  Foot WWP      A/P: 43F s/p L PALOMA DOS: 5/28/19 with Dr. Ugarte   -ASA 325mg BID for DVT ppx  -50% WB LLE  -Maintain TAYLOR drain, will likely pull later today  -Posterior hip precautions: No adduction, internal rotation, or extreme hip flexion  -PT/OT  -Multimodal pain control  -AM Labs pending  -Dispo: anticipate DC home with home health care and home PT, likely stable today pending PT eval on stairs

## 2019-05-31 NOTE — DISCHARGE INSTRUCTIONS
DR Ugarte - TOTAL Hip REPLACEMENT  DISCHARGE INSTRUCTIONS  You have just had a total knee replacement and it is important to follow these specific rules to ensure your safety. Please look over these instructions before your discharge and ask questions about anything you do not understand.  Call Dr Ugarte’s office at Tel # [phone] - to schedule your post-operative appointment in 2-3 weeks with Dr. Ugarte      1) Do the exercises as instructed by Dr. Ugarte and the physical therapist. You will be given exercises to follow at discharge from the hospital. Maintain 50 % weight bearing on left leg  until your follow up with Dr. Ugarte.  Continue to use abductor pillow while in bed    2) No tub bath, pools or submerging wound until cleared by physician.  You may shower after 7 days and can get incision wet after that. NO TEDs or compression stockings    3) No ointments or creams on incision site for 6 weeks    4) Incision: Keep incision covered with Mepilex silver dressing for 5 days. After 5 days, you may leave incision open to air if there is no drainage. Keep incision covered when outside to avoid sunlight on incision      5) Only short car rides after until your first post-operative visit after discharge are permitted. No driving for at least 4 to 6 weeks after surgery.    6) Ice area several times daily on the inside and outside of the knee as well as the thigh of the operated leg.  Do not place ice directly on incision site. If possible may ice every 2 hours for 15 minutes while awake. Stop using ice 7 days from surgery.    7) Call 's office at  [phone] for:  • Increased pain, redness, warmth, swelling, or drainage from your incision  • Temperature elevation above 101 degrees    8)  Whenever you have an operative or invasive procedure (dental, urologic, podiatric, etc.), check with your physician to remind her/him that you have a total knee prosthesis and may need antibiotics before the  procedure.  This will be reviewed at your first post-op visit.

## 2019-05-31 NOTE — DISCHARGE SUMMARY
REPORT TYPE:  Discharge Summary    DATE OF DISCHARGE:      I saw Tatum Dhaliwal today at bedside.  Her leg looks very good.  Her surgical area is soft.  There is no evidence of any hematoma.  Her Hemovac did put out about 300 mL.  We will keep it in for another day.  Hopefully, however, we can discontinue it   tomorrow and get her out over the weekend.  I did talk to Susana, our ortho PA, and we will decrease her ASPIRIN 81 mg p.o. b.i.d. down from 325 mg.  She can 50% weightbear.  I can see her back in about 2 weeks for followup at that time.  Hip precautions   were once again emphasized.  Patient has already had a significant decrease in pain status post-surgery.  Questions were answered thoroughly.      DAVID DAVID MD        CC:     DD: 05/31/2019 09:57  DT: 05/31/2019 10:28  Voice ID: [id number]/Report ID: [id number]  [email or web address]

## 2019-05-31 NOTE — PLAN OF CARE
Problem: Patient Care Overview  Goal: Plan of Care Review  Outcome: Ongoing (interventions implemented as appropriate)   05/30/19 1942 05/31/19 0000   Coping/Psychosocial   Plan Of Care Reviewed With --  patient   Plan of Care Review   Progress improving --        Problem: Pressure Ulcer Risk (Nathan Scale) (Adult,Obstetrics,Pediatric)  Goal: Skin Integrity  Outcome: Ongoing (interventions implemented as appropriate)      Problem: Hip Arthroplasty (Total, Partial) (Adult)  Goal: Signs and Symptoms of Listed Potential Problems Will be Absent, Minimized or Managed (Hip Arthroplasty)  Outcome: Ongoing (interventions implemented as appropriate)      Problem: Fall Risk (Adult)  Goal: Absence of Falls  Outcome: Ongoing (interventions implemented as appropriate)      Problem: Pain, Acute (Adult)  Goal: Acceptable Pain Control/Comfort Level  Outcome: Ongoing (interventions implemented as appropriate)

## 2019-05-31 NOTE — PROGRESS NOTES
Patient: Tatum Dhaliwal   MRN: [id number]   : 1975       Daily Progress Note  (LOS: 3 days) 2 Day Post-Op s/p Procedure(s):  Left Total Hip Arthroplasty with Gap Ring and Constraint Cup     Subjective     43 y.o. y/o female s/p Procedure(s):  Left Total Hip Arthroplasty with Gap Ring and Constraint Cup.     Patient reports pain controlled with current pain regimen. Pt reports clicking in left hip - this was d/w Dr. Ugarte. Admits to tingling in right hand that was present preop for 2 months and has worsened recently.Pt reports less  tingling in right hand today. Patient is voiding without difficulties. Denies chest pain, shortness of breath. Denies nausea or vomiting. Admits to flatus but no BM. Voiding without difficulties. Tolerating diet. OOB with PT today. Denies fever or chills.     Patient Active Problem List   Diagnosis   • Preop examination   • Primary osteoarthritis of left hip   • Breast cancer metastasized to bone, left (CMS/HCC) (HCC)   • S/P total hip arthroplasty   • Iron deficiency anemia   • Class 1 obesity due to excess calories without serious comorbidity with body mass index (BMI) of 32.0 to 32.9 in adult   • Leukemoid reaction   • Arthritis of left hip       Objective     LAST VITALS:    Vitals:    19 0000 19 0700 19 0845 19 1500   BP:  (!) 120/56  (!) 109/54   BP Location:       Patient Position:       Pulse: 100 98  92   Resp:  17  17   Temp:  36.8 °C (98.2 °F)  36.7 °C (98 °F)   TempSrc:       SpO2: 96%  97%    Weight:       Height:           Temp (24hrs), Av.7 °C (98.1 °F), Min:36.7 °C (98 °F), Max:36.8 °C (98.2 °F)        Intake/Output Summary (Last 24 hours) at 19 1616  Last data filed at 19 1222   Gross per 24 hour   Intake              800 ml   Output              855 ml   Net              -55 ml      I/O this shift:  In: -   Out: 250 [Urine:200; Drains:50]    Allergies: Morphine and Amoxicillin       PHYSICAL EXAM:    2 Mepilex silver  dressings: clean, dry and intact with expected postop edema and ecchymosis. Drain site is leaking - dressing placed over drain site. TAYLOR drain output 325 cc x 24 hours. + 5/5 DF/PF/EHL bilateral. +NVI with sensation intact. Distal Pulses: 2+ bilateral,Calves: soft, non tender bilateral      LABS:       Results from last 7 days  Lab Units 05/31/19  0801   WBC K/uL 11.11*   HEMOGLOBIN g/dL 8.1*   HEMATOCRIT % 25.3*   PLATELETS K/uL 171   SODIUM mEQ/L 142   POTASSIUM mEQ/L 3.7   CHLORIDE mEQ/L 110*   CO2 mEQ/L 27   BUN mg/dL 5*   CREATININE mg/dL 0.5*   GLUCOSE mg/dL 104*   CALCIUM mg/dL 8.3*         Assessment/Plan     43 y.o. y/o female s/p Procedure(s):  Left Total Hip Arthroplasty with Gap Ring and Constraint Cup     3 Day Post-Op     1. DVT prophylaxis: Asprin 81 mg po BID for 6 weeks and bilateral SCD. Aspirin dose reduced form 325 mg BID to 81 mg BID due to increased risk of bleeding and drainage from drain site per Dr. Ugarte recommendations.   2. PT/OT: 50% WB on Left LE with strict hip precautions. Abductor pillow while in bed.    3. Incentive spirometer  4. Pain management: Scheduled: tylenol, celebrex, toradol PRN: oxycodone, tramadol, IV dilaudid   5. Bowel regimen: senokot, colace, miralax  6.  Medicine: appreciate recommendations   7. Expected post op Leukocytosis: Trending down. Stable- continue to monitor. Pt is afebrile and asymptomatic. Most likely elevated secondary to stress of surgery.   8. Worsening tingling in right hand: Per Dr. Ugarte and Dr. Cortez, pt's oncologist at [facility] - MRI completed of cervical spine and brain with and without contrast. No metastatic disease present. Spondylosis of cervical spine C5, 6, 7  9. TAYLOR drain: output 325 cc today. Per Dr. Ugarte - rec. Keep drain in until tomorrow due to drain output.  10. Clicking in left hip: this was d/w Dr. Ugarte. Per Dr. Ugarte - this will eventually subside with scar tissue formation  11. Acute postop anemia: this is due to  postop blood loss. Pt's VSS without signs or symptoms of anemia. No indication for transfusion at this time. Recheck CBC tomorrow to continue to monitor.    Dispo: D/C home with home care tomorrow if stable per PT/OT/medicine    GAMA Devi  5/31/2019  4:16 PM

## 2019-05-31 NOTE — PROGRESS NOTES
Patient discussed in CPR. Her drain is still putting out significant amounts, possible d/c for tomorrow. [facility] accept, they are asking for DCI faxed to [phone] and for update on dc date to [phone]. Will sign out to weekend CM to follow. Will continue to follow patient and assist with any skilled discharge needs. -- Seema Shi RN CC [id number]

## 2019-05-31 NOTE — PLAN OF CARE
Problem: Patient Care Overview  Goal: Plan of Care Review  Outcome: Ongoing (interventions implemented as appropriate)   05/31/19 1035   Coping/Psychosocial   Plan Of Care Reviewed With patient   Plan of Care Review   Progress progress toward functional goals as expected   Outcome Summary Pt progressing well w/ transfers and mob. Anticipate home w/ family support and home PT.       Problem: Hip Arthroplasty (Total, Partial) (Adult)  Goal: Signs and Symptoms of Listed Potential Problems Will be Absent, Minimized or Managed (Hip Arthroplasty)  Outcome: Ongoing (interventions implemented as appropriate)      Problem: Fall Risk (Adult)  Goal: Absence of Falls  Outcome: Ongoing (interventions implemented as appropriate)

## 2019-05-31 NOTE — PLAN OF CARE
Problem: Patient Care Overview  Goal: Plan of Care Review  Outcome: Ongoing (interventions implemented as appropriate)   05/31/19 7094   Coping/Psychosocial   Plan Of Care Reviewed With patient   Plan of Care Review   Progress improving   Outcome Summary Pain controlled with PO meds. Abductor pillow in use when in bed.       Problem: Pressure Ulcer Risk (Nathan Scale) (Adult,Obstetrics,Pediatric)  Goal: Skin Integrity  Outcome: Ongoing (interventions implemented as appropriate)      Problem: Hip Arthroplasty (Total, Partial) (Adult)  Goal: Signs and Symptoms of Listed Potential Problems Will be Absent, Minimized or Managed (Hip Arthroplasty)  Outcome: Ongoing (interventions implemented as appropriate)      Problem: Fall Risk (Adult)  Goal: Absence of Falls  Outcome: Ongoing (interventions implemented as appropriate)      Problem: Pain, Acute (Adult)  Goal: Acceptable Pain Control/Comfort Level  Outcome: Ongoing (interventions implemented as appropriate)

## 2019-05-31 NOTE — PROGRESS NOTES
Patient: Tatum Dhaliwal  Location: [place] [address]  MRN: [id number]  Today's date: 5/31/2019     Pt seated on commode at end of session w/ call bell in reach.  All pt needs met at this time.    Hospital Course  Tatum is a 43 y.o. female admitted on 5/28/2019 with Arthritis of left hip [M16.12]  S/P total hip arthroplasty [Z96.649]. Principal problem is Breast cancer metastasized to bone, left (CMS/[facility]) ([facility]).    Tatum has a past medical history of Anemia; Breast cancer (CMS/HCC) ([facility]) (2007); Nausea; and Neuropathy.     s/p   1.  Left total hip arthroplasty using Alleman constrained hip system.  2.  Open reduction and internal fixation of left ilium with gap cup plating system.  3.  Neurolysis and visualization of the sciatic nerve.          Therapy Pain/Vitals - 05/31/19 0941        Pain/Comfort/Sleep    Pain Charting Type Pain Assessment    Presence of Pain complains of pain/discomfort    Preferred Pain Scale number (Numeric Rating Pain Scale)    Pain Body Location - Side Left    Pain Body Location hip    Pain Rating (0-10): Rest 5    Pain Rating (0-10): Activity 6    Quality aching    Pain Management Interventions position adjusted          Prior Living Environment      Most Recent Value   Lives With  child(oscar), adult, spouse   Living Arrangements  house   Living Environment Comment  3SH, 7 REKHA, bed and bath 2nd floor, tub/shower   Number of Stairs, Main Entrance  seven   Stair Railings, Main Entrance  railings safe and in good condition   Equipment Currently Used at Home  cane, quad   Stairs, Within Home, Primary  13   Stair Railings, Within Home, Primary  railings safe and in good condition          Prior Level of Function      Most Recent Value   Ambulation  assistive equipment   Transferring  assistive equipment   Toileting  assistive equipment   Bathing  assistive person   Dressing  assistive person   Eating  independent   Communication  understands/communicates without  difficulty   Swallowing  swallows foods/liquids without difficulty   Equipment Currently Used at Home  cane, quad   Prior Functional Level Comment  Pt initially reports I with ADLs and mob with SPC PTA, later in eval states she needed assist with dressing and sometimes needing assist with bed mob/xfers                PT Treatment Summary - 05/31/19 0941        Session Details    Document Type daily treatment    Mode of Treatment physical therapy       Time Calculation    Start Time 0941    Stop Time 1019    Time Calculation (min) 38 min       General Information    Patient Profile Reviewed? yes    Existing Precautions/Restrictions fall;weight bearing       Weight Bearing Status    Left LE Weight Bearing Status partial weight bearing   50%       Bed Mobility    Gem, Roll Right modified independence    Gem, Supine to Sit modified independence       Sit to Stand Transfer    Gem, Sit to Stand Transfer modified independence    Assistive Device walker, front-wheeled       Stand to Sit Transfer    Gem, Stand to Sit Transfer modified independence    Assistive Device walker, front-wheeled       Gait Training    Gem, Gait supervision    Assistive Device walker, front-wheeled    Distance in Feet 40 feet   40' x 2, seated rest break between    Gait Pattern Utilized step-to    Gait Deviations Identified decreased jannie;decreased gait speed;decreased heel strike;decreased step length    Maintains Weight Bearing Status able to maintain weight bearing status    Comment Pt appears to be maintaining 50%wbing.       Stairs Training    Gem, Stairs close supervision    Assistive Device railing;crutches, axillary    Handrail Location left side (ascending);right side (ascending)    Number of Stairs 9    Ascending Stairs Technique step-to-step    Descending Stairs Technique step-to-step       AM-PAC (TM) - Mobility (Current Function)    Turning from your back to your side while in a  flat bed without using bedrails? 4 - None    Moving from lying on your back to sitting on the side of a flat bed without using bedrails? 4 - None    Moving to and from a bed to a chair? 4 - None    Standing up from a chair using your arms? 4 - None    To walk in a hospital room? 3 - A Little    Climbing 3-5 steps with a railing? 3 - A Little    AM-PAC (TM) Mobility Score 22       PT Clinical Impression    Plan For Care Reviewed: Physical Therapy PT plan for care discussed with patient    Rehab Potential/Prognosis good, to achieve stated therapy goals    PT Frequency of Treatment 5-7 times per week    Problem List decreased strength;decreased ROM;impaired balance;pain    Anticipated Equipment Needs at Discharge bedside commode;front wheeled walker    PT Recommended Discharge Disposition home with assist;home with home health    Daily Outcome Statement Pt progressing well w/ transfers and mob.  Able to perform stairs.  Anticipate home w/ family support and home PT.          Pain Assessment/Intervention  Pain Charting Type: Pain Assessment             Education provided this session. See the Patient Education summary report for full details.    PT Care Plan Goals      Most Recent Value   Stair Goal, PT   PT STG: Stairs  supervision required   PT STG: Number of Stairs  13   PT STG Assistive Device: Stairs  crutches, axillary      PT Care Plan Goals      Most Recent Value   Bed Mobility Goal   Date Goal Established: Bed Mobility  05/29/19   Time to Achieve Goal: Bed Mobility  3 days   Goal Activity: Bed Mobility  all bed mobility activities   Level of [place] Goal: Bed Mobility  independent   Goal Outcome: Bed Mobility  goal ongoing   Gait Goal   Date Goal Established: Gait Training  05/29/19   Time to Achieve Goal: Gait Training  3 days   Level of [place]  modified independence   Assistive Device: Gait Training  walker, front-wheeled   Distance Goal: Gait Training (feet)  50 feet   Goal Outcome: Gait Training   goal ongoing   Transfer Goal   Date Goal Established: Transfer Training  05/29/19   Time to Achieve Goal: Transfer Training  3 days   Goal Activity: Transfer Training  all transfers   Level of [place] Goal: Transfer Training  modified independence   Assistive Device: Transfer Training  walker, front-wheeled   Goal Outcome: Transfer Training  goal ongoing

## 2019-06-01 VITALS
HEIGHT: 65 IN | BODY MASS INDEX: 34.16 KG/M2 | DIASTOLIC BLOOD PRESSURE: 55 MMHG | RESPIRATION RATE: 18 BRPM | TEMPERATURE: 97.7 F | WEIGHT: 205.03 LBS | SYSTOLIC BLOOD PRESSURE: 116 MMHG | OXYGEN SATURATION: 96 % | HEART RATE: 101 BPM

## 2019-06-01 LAB
ERYTHROCYTE [DISTWIDTH] IN BLOOD BY AUTOMATED COUNT: 17.4 % (ref 11.7–14.4)
HCT VFR BLDCO AUTO: 25.3 %
HGB BLD-MCNC: 8 G/DL
MCH RBC QN AUTO: 25.6 PG (ref 28–33.2)
MCHC RBC AUTO-ENTMCNC: 31.6 G/DL (ref 32.2–35.5)
MCV RBC AUTO: 80.8 FL (ref 83–98)
PDW BLD AUTO: 9.3 FL (ref 9.4–12.3)
PLATELET # BLD AUTO: 205 K/UL
RBC # BLD AUTO: 3.13 M/UL (ref 3.93–5.22)
WBC # BLD AUTO: 10.09 K/UL

## 2019-06-01 PROCEDURE — 97116 GAIT TRAINING THERAPY: CPT | Mod: GP

## 2019-06-01 PROCEDURE — 97530 THERAPEUTIC ACTIVITIES: CPT | Mod: GO

## 2019-06-01 PROCEDURE — 85027 COMPLETE CBC AUTOMATED: CPT | Performed by: PHYSICIAN ASSISTANT

## 2019-06-01 PROCEDURE — 36415 COLL VENOUS BLD VENIPUNCTURE: CPT | Performed by: PHYSICIAN ASSISTANT

## 2019-06-01 PROCEDURE — 63700000 HC SELF-ADMINISTRABLE DRUG: Performed by: PHYSICIAN ASSISTANT

## 2019-06-01 RX ADMIN — POLYETHYLENE GLYCOL 3350 17 G: 17 POWDER, FOR SOLUTION ORAL at 08:42

## 2019-06-01 RX ADMIN — MULTIPLE VITAMINS W/ MINERALS TAB 1 TABLET: TAB at 17:51

## 2019-06-01 RX ADMIN — ACETAMINOPHEN 975 MG: 325 TABLET ORAL at 17:51

## 2019-06-01 RX ADMIN — OXYCODONE HYDROCHLORIDE 10 MG: 5 TABLET ORAL at 18:49

## 2019-06-01 RX ADMIN — PANTOPRAZOLE SODIUM 40 MG: 40 TABLET, DELAYED RELEASE ORAL at 08:42

## 2019-06-01 RX ADMIN — SENNOSIDES AND DOCUSATE SODIUM 1 TABLET: 8.6; 5 TABLET ORAL at 08:42

## 2019-06-01 RX ADMIN — FERROUS SULFATE TAB 325 MG (65 MG ELEMENTAL FE) 325 MG: 325 (65 FE) TAB at 08:42

## 2019-06-01 RX ADMIN — CELECOXIB 200 MG: 200 CAPSULE ORAL at 08:41

## 2019-06-01 RX ADMIN — OXYCODONE HYDROCHLORIDE 10 MG: 5 TABLET ORAL at 05:19

## 2019-06-01 RX ADMIN — GABAPENTIN 300 MG: 300 CAPSULE ORAL at 08:41

## 2019-06-01 RX ADMIN — BISACODYL 10 MG: 10 SUPPOSITORY RECTAL at 06:50

## 2019-06-01 RX ADMIN — OXYCODONE HYDROCHLORIDE 10 MG: 5 TABLET ORAL at 12:25

## 2019-06-01 RX ADMIN — ACETAMINOPHEN 975 MG: 325 TABLET ORAL at 01:59

## 2019-06-01 RX ADMIN — ASPIRIN 81 MG: 81 TABLET, COATED ORAL at 19:00

## 2019-06-01 RX ADMIN — EXEMESTANE 25 MG: 25 TABLET ORAL at 08:43

## 2019-06-01 RX ADMIN — ONDANSETRON 4 MG: 4 TABLET, ORALLY DISINTEGRATING ORAL at 05:19

## 2019-06-01 RX ADMIN — ACETAMINOPHEN 975 MG: 325 TABLET ORAL at 08:41

## 2019-06-01 RX ADMIN — ASPIRIN 81 MG: 81 TABLET, COATED ORAL at 08:42

## 2019-06-01 ASSESSMENT — COGNITIVE AND FUNCTIONAL STATUS - GENERAL
HELP NEEDED FOR BATHING: 3 - A LITTLE
CLIMB 3 TO 5 STEPS WITH RAILING: 3 - A LITTLE
TOILETING: 3 - A LITTLE
EATING MEALS: 4 - NONE
MOVING TO AND FROM BED TO CHAIR: 4 - NONE
DRESSING REGULAR UPPER BODY CLOTHING: 3 - A LITTLE
HELP NEEDED FOR PERSONAL GROOMING: 3 - A LITTLE
STANDING UP FROM CHAIR USING ARMS: 4 - NONE
DRESSING REGULAR LOWER BODY CLOTHING: 3 - A LITTLE
WALKING IN HOSPITAL ROOM: 3 - A LITTLE

## 2019-06-01 NOTE — PROGRESS NOTES
S: Patient seen and examined at bedside.  Patient reports no acute events overnight.  Patient states her pain has been well controlled. Patient denies fevers, chills, chest pain or shortness of breath.  Patient states she was able to walk around the unit yesterday with rolling walker during physical therapy, and stairs with crutches. Patient reports no new complaints at this time.     O:  PE:  LLE  General: AAOx3, NAD, VSS  Dressing clean, dry and intact  TAYLOR drain intact, functioning with 20cc sanguinous output  Compartments soft and compressible  Motor intact FHL/EHL/TA/GSC/Q/HS  SILT DP/SP/T/S/S  2+DP/PT pulses  BCR  Foot WWP      A/P: 43F s/p L PALOMA DOS: 5/28/19 with Dr. Ugarte   -ASA 325mg BID for DVT ppx  -50% WB LLE  -Maintain TAYLOR drain, will pull today  -Posterior hip precautions: No adduction, internal rotation, or extreme hip flexion  -PT/OT  -Multimodal pain control  -AM Labs pending  -Dispo: anticipate DC home with home health care and home PT, stable for DC today

## 2019-06-01 NOTE — PROGRESS NOTES
Patient: Tatum Dhaliwal  Location: [address]  MRN: [id number]  Today's date: 6/1/2019    Hospital Course  Tatum is a 43 y.o. female admitted on 5/28/2019 with Arthritis of left hip [M16.12]  S/P total hip arthroplasty [Z96.649]. Principal problem is Breast cancer metastasized to bone, left (CMS/HCC) ([facility]).    Tatum has a past medical history of Anemia; Breast cancer (CMS/HCC) ([facility]) (2007); Nausea; and Neuropathy.     s/p   1.  Left total hip arthroplasty using Melissa constrained hip system.  2.  Open reduction and internal fixation of left ilium with gap cup plating system.  3.  Neurolysis and visualization of the sciatic nerve.     Pt  Left supine in bed with call bell in reach.           Therapy Pain/Vitals - 06/01/19 1230        Pain/Comfort/Sleep    Presence of Pain complains of pain/discomfort    Pain Body Location hip    Pain Rating (0-10): Rest 5    Pain Rating (0-10): Activity 7    Pain Management Interventions premedicated for activity       Vital Signs    Pulse 95          Prior Living Environment      Most Recent Value   Lives With  child(oscar), adult, spouse   Living Arrangements  house   Living Environment Comment  3SH, 7 REKHA, bed and bath 2nd floor, tub/shower   Number of Stairs, Main Entrance  seven   Stair Railings, Main Entrance  railings safe and in good condition   Equipment Currently Used at Home  cane, quad   Stairs, Within Home, Primary  13   Stair Railings, Within Home, Primary  railings safe and in good condition          Prior Level of Function      Most Recent Value   Ambulation  assistive equipment   Transferring  assistive equipment   Toileting  assistive equipment   Bathing  assistive person   Dressing  assistive person   Eating  independent   Communication  understands/communicates without difficulty   Swallowing  swallows foods/liquids without difficulty   Equipment Currently Used at Home  cane, quad   Prior Functional Level Comment  Pt initially  reports I with ADLs and mob with SPC PTA, later in eval states she needed assist with dressing and sometimes needing assist with bed mob/xfers                OT Treatment Summary - 06/01/19 1500        Session Details    Document Type daily treatment    Mode of Treatment occupational therapy       Time Calculation    Start Time 1230    Stop Time 1255    Time Calculation (min) 25 min       General Information    Patient Profile Reviewed? yes    Existing Precautions/Restrictions fall;weight bearing       Weight Bearing Status    Left LE Weight Bearing Status partial weight bearing   50%       Toilet Transfer    [place], Toilet Transfer supervision    Assistive Device commode chair    Comment Pt will benefit from commode at home 2* weight bearing restriction and pain managment        Shower Transfer Training    Comment educated pt on safe sit pivot method into tub using shower chair. Educated pt on where to purchase shower chair        Home Safety    Household Training other (see comments);position body to avoid hazards;restructuring of routines to decrease fatigue/frustration   walker bag for item transport        AM-PAC (TM) - ADL (Current Function)    Putting on and taking off regular lower body clothing? 3 - A Little    Bathing? 3 - A Little    Toileting? 3 - A Little    Putting on/taking off regular upper body clothing? 3 - A Little    How much help for taking care of personal grooming? 3 - A Little    Eating meals? 4 - None    AM-PAC (TM) ADL Score 19                   Education provided this session. See the Patient Education summary report for full details.         OT Care Plan Goals      Most Recent Value   Bed Mobility Goal   Date Goal Established: Bed Mobility  05/29/19   Time to Achieve Goal: Bed Mobility  3 days   Goal Activity: Bed Mobility  all bed mobility activities   Level of [place] Goal: Bed Mobility  independent   Goal Outcome: Bed Mobility  goal ongoing   Lower Body Dressing Goal   Date  Goal Established: Lower Body Dressing  05/29/19   Time to Achieve Goal: Lower Body Dressing  by discharge   Level of [place]  minimum assist (75% or more patient effort)   Adaptive Equipment: Lower Body Dressing  sock-aid, reacher, dressing stick   Goal Outcome: Lower Body Dressing  goal ongoing   Toilet Transfer Goal   Date Goal Established: Toilet Transfer Training  05/29/19   Time to Achieve Goal: Toilet Transfer Training  by discharge   Level of [place] Goal: Toilet Transfer Training  supervision required   Goal Outcome: Toilet Transfer Training  goal ongoing   Transfer Goal   Date Goal Established: Transfer Training  05/29/19   Time to Achieve Goal: Transfer Training  3 days   Goal Activity: Transfer Training  all transfers   Level of [place] Goal: Transfer Training  modified independence   Assistive Device: Transfer Training  walker, front-wheeled   Goal Outcome: Transfer Training  goal ongoing   Tub-Shower Transfer Goal   Date Goal Established: Tub-Shower Transfer Training  05/29/19   Time to Achieve Goal: Tub-Shower Transfer Training  by discharge   Level of [place] Goal: Tub-Shower Transfer Training  minimum assist (75% or more patient effort)   Goal Outcome: Tub-Shower Transfer Training  goal ongoing

## 2019-06-01 NOTE — NURSING NOTE
Dc instructions provided to pt. Pt has no scripts since she has oxycodone at home from another provider, pt aware. Pt received BID aspirin prior to leaving since she does not have aspirin at home but will get tomorrow. IV removed, crutches/walker/cane/hip kit/wedge cushion all with pt.

## 2019-06-01 NOTE — PLAN OF CARE
Problem: Patient Care Overview  Goal: Plan of Care Review  Outcome: Ongoing (interventions implemented as appropriate)    Goal: Individualization & Mutuality  Outcome: Adequate for Discharge      Problem: Hip Arthroplasty (Total, Partial) (Adult)  Intervention: Promote Early Mobility/Activity   06/01/19 0865   Activity   Assistive Device Utilized crutches;walker   Activity activity adjusted per tolerance

## 2019-06-01 NOTE — PLAN OF CARE
Problem: Patient Care Overview  Goal: Plan of Care Review  Outcome: Ongoing (interventions implemented as appropriate)   06/01/19 0841 06/01/19 1329   Coping/Psychosocial   Plan Of Care Reviewed With patient --    Plan of Care Review   Progress --  no change   Outcome Summary --  Pt for DC this evening via ambulance to home. Pt verbalizes readiness for DC.        Problem: Pressure Ulcer Risk (Nathan Scale) (Adult,Obstetrics,Pediatric)  Goal: Skin Integrity  Outcome: Ongoing (interventions implemented as appropriate)      Problem: Hip Arthroplasty (Total, Partial) (Adult)  Goal: Signs and Symptoms of Listed Potential Problems Will be Absent, Minimized or Managed (Hip Arthroplasty)  Outcome: Ongoing (interventions implemented as appropriate)      Problem: Fall Risk (Adult)  Goal: Absence of Falls  Outcome: Ongoing (interventions implemented as appropriate)      Problem: Pain, Acute (Adult)  Goal: Acceptable Pain Control/Comfort Level  Outcome: Ongoing (interventions implemented as appropriate)

## 2019-06-01 NOTE — PROGRESS NOTES
Patient: Tatum Dhaliwal  Location: [place] [address]  MRN: [id number]  Today's date: 6/1/2019  Returned to bed   Hospital Course  Tatum is a 43 y.o. female admitted on 5/28/2019 with Arthritis of left hip [M16.12]  S/P total hip arthroplasty [Z96.649]. Principal problem is Breast cancer metastasized to bone, left (CMS/HCC) ([facility]).    Tatum has a past medical history of Anemia; Breast cancer (CMS/HCC) ([facility]) (2007); Nausea; and Neuropathy.     s/p   1.  Left total hip arthroplasty using Melissa constrained hip system.  2.  Open reduction and internal fixation of left ilium with gap cup plating system.  3.  Neurolysis and visualization of the sciatic nerve.         Prior Living Environment      Most Recent Value   Lives With  child(oscar), adult, spouse   Living Arrangements  house   Living Environment Comment  3SH, 7 REKHA, bed and bath 2nd floor, tub/shower   Number of Stairs, Main Entrance  seven   Stair Railings, Main Entrance  railings safe and in good condition   Equipment Currently Used at Home  cane, quad   Stairs, Within Home, Primary  13   Stair Railings, Within Home, Primary  railings safe and in good condition          Prior Level of Function      Most Recent Value   Ambulation  assistive equipment   Transferring  assistive equipment   Toileting  assistive equipment   Bathing  assistive person   Dressing  assistive person   Eating  independent   Communication  understands/communicates without difficulty   Swallowing  swallows foods/liquids without difficulty   Equipment Currently Used at Home  cane, quad   Prior Functional Level Comment  Pt initially reports I with ADLs and mob with SPC PTA, later in eval states she needed assist with dressing and sometimes needing assist with bed mob/xfers                PT Treatment Summary - 06/01/19 1200        Session Details    Document Type daily treatment    Mode of Treatment individual therapy;physical therapy    Patient/Family Observations in  bed        Time Calculation    Start Time 1200    Stop Time 1235    Time Calculation (min) 35 min       General Information    Patient Profile Reviewed? yes    Onset of Illness/Injury or Date of Surgery 05/28/19    Referring Physician raissa    Pertinent History of Current Functional Problem s/p L THR ca with mets     Existing Precautions/Restrictions fall;weight bearing       Weight Bearing Status    Left LE Weight Bearing Status partial weight bearing       Coping Strategies    Trust Relationship/Rapport care explained       Orientation Log    Comment alert and oriented        Manual Muscle Testing (MMT)    Comment LLE weakness        Bed Mobility    [place], Roll Left modified independence    [place], Roll Right modified independence    [place], Supine to Sit modified independence    [place], Sit to Supine modified independence    Assistive Device (Bed Mobility) bed rails       Sit to Stand Transfer    [place], Sit to Stand Transfer modified independence    Verbal Cues hand placement    Assistive Device walker, front-wheeled       Stand to Sit Transfer    [place], Stand to Sit Transfer modified independence    Verbal Cues hand placement    Assistive Device walker, front-wheeled       Gait Training    [place], Gait supervision    Assistive Device walker, front-wheeled    Distance in Feet 45 feet    Gait Pattern Utilized step-to    Gait Deviations Identified decreased gait speed    Maintains Weight Bearing Status able to maintain weight bearing status    Comment 50 percent on L        Stairs Training    [place], Stairs supervision    Assistive Device railing;crutches, axillary    Handrail Location left side (ascending)    Ascending Stairs Technique step-to-step    Descending Stairs Technique step-to-step       AM-PAC (TM) - Mobility (Current Function)    Turning from your back to your side while in a flat bed without using bedrails? 4 - None    Moving from lying on your  back to sitting on the side of a flat bed without using bedrails? 4 - None    Moving to and from a bed to a chair? 4 - None    Standing up from a chair using your arms? 4 - None    To walk in a hospital room? 3 - A Little    Climbing 3-5 steps with a railing? 3 - A Little    AM-PAC (TM) Mobility Score 22       PT Clinical Impression    Plan For Care Reviewed: Physical Therapy PT plan for care discussed with patient    Impairments Found (PT Eval) gait, locomotion, and balance    Functional Limitations in Following Categories (PT Eval) home management    Rehab Potential/Prognosis good, to achieve stated therapy goals    PT Frequency of Treatment 5-7 times per week    Problem List decreased strength    Anticipated Equipment Needs at Discharge bedside commode;front wheeled walker    PT Recommended Discharge Disposition home with home health    Daily Outcome Statement for dc to home questionable sitting tolerance . given all nessary equipment for home                   Equipment Provided: Walker, with Wheels, Adult   Vendor: [employer]    Education provided this session. See the Patient Education summary report for full details.    PT Care Plan Goals      Most Recent Value   Stair Goal, PT   PT STG: Stairs  supervision required   PT STG: Number of Stairs  13   PT STG Assistive Device: Stairs  crutches, axillary      PT Care Plan Goals      Most Recent Value   Bed Mobility Goal   Date Goal Established: Bed Mobility  05/29/19   Time to Achieve Goal: Bed Mobility  3 days   Goal Activity: Bed Mobility  all bed mobility activities   Level of [place] Goal: Bed Mobility  independent   Goal Outcome: Bed Mobility  goal ongoing   Gait Goal   Date Goal Established: Gait Training  05/29/19   Time to Achieve Goal: Gait Training  3 days   Level of [place]  modified independence   Assistive Device: Gait Training  walker, front-wheeled   Distance Goal: Gait Training (feet)  50 feet   Goal Outcome:  Gait Training  goal ongoing   Transfer Goal   Date Goal Established: Transfer Training  05/29/19   Time to Achieve Goal: Transfer Training  3 days   Goal Activity: Transfer Training  all transfers   Level of [place] Goal: Transfer Training  modified independence   Assistive Device: Transfer Training  walker, front-wheeled   Goal Outcome: Transfer Training  goal ongoing

## 2019-06-01 NOTE — PLAN OF CARE
Problem: Patient Care Overview  Goal: Plan of Care Review  Outcome: Ongoing (interventions implemented as appropriate)   06/01/19 2772   Coping/Psychosocial   Plan Of Care Reviewed With patient   Plan of Care Review   Progress progress toward functional goals as expected   Outcome Summary sup for ADLs and transfers        Problem: Hip Arthroplasty (Total, Partial) (Adult)  Goal: Signs and Symptoms of Listed Potential Problems Will be Absent, Minimized or Managed (Hip Arthroplasty)  Outcome: Ongoing (interventions implemented as appropriate)

## 2019-06-01 NOTE — PLAN OF CARE
Problem: Patient Care Overview  Goal: Plan of Care Review  Outcome: Ongoing (interventions implemented as appropriate)   06/01/19 0005   Coping/Psychosocial   Plan Of Care Reviewed With patient   Plan of Care Review   Progress improving       Problem: Fall Risk (Adult)  Goal: Absence of Falls  Outcome: Ongoing (interventions implemented as appropriate)      Problem: Pain, Acute (Adult)  Goal: Acceptable Pain Control/Comfort Level  Outcome: Ongoing (interventions implemented as appropriate)

## 2019-06-01 NOTE — PLAN OF CARE
Problem: Patient Care Overview  Goal: Plan of Care Review  Outcome: Ongoing (interventions implemented as appropriate)   06/01/19 0416   Coping/Psychosocial   Plan Of Care Reviewed With patient   Plan of Care Review   Progress improving       Problem: Pressure Ulcer Risk (Nathan Scale) (Adult,Obstetrics,Pediatric)  Goal: Skin Integrity  Outcome: Ongoing (interventions implemented as appropriate)      Problem: Fall Risk (Adult)  Goal: Absence of Falls  Outcome: Ongoing (interventions implemented as appropriate)      Problem: Pain, Acute (Adult)  Goal: Acceptable Pain Control/Comfort Level  Outcome: Ongoing (interventions implemented as appropriate)

## 2019-06-01 NOTE — PROGRESS NOTES
Weekend CM: Patient discharging today. BLs set for 1930. Patient given BSC per therapy recs. [facility] aware patient is discharging, [facility] faxed. -- Seema Shi RN CC [id number]

## 2019-06-19 NOTE — H&P (VIEW-ONLY)
Hospital Medicine Service -  Inpatient Consultation         Requesting Physician: Dr. Ugarte   Reason for Consultation: medical management     HISTORY OF PRESENT ILLNESS        This is a 44 y/o F PMH L breast ca dx 2007 now metastic to liver s/p resection 1/2017, then found to have mets to inferior pelvic bone iwth soft tissue extension 11/17 s/p XRT last 11/2018 now with end stage L hip OA, iron deficiency anemia, hypokalemia, presenting for elective L PALOMA with gap ring and constraint cup.    For full consult note, please see consult by Dr. Valadez on 5/13/2019.    Pt. States her pain is currently controlled . She knows what pain regimen works for her.  Denies N/V, chest pain, dyspnea.  Eager to walk.      PAST MEDICAL AND SURGICAL HISTORY        Past Medical History:   Diagnosis Date   • Anemia     has had iron infusions   • Breast cancer (CMS/HCC) (HCC) 2007    left breast xrt, 2008, 2017,2018/ chemo 2013, 2015, and currently   • Nausea     [id number]-[id number] prn   • Neuropathy     hands and feet secondary to chemo       Past Surgical History:   Procedure Laterality Date   • BREAST LUMPECTOMY Left 2007   • LIVER RESECTION  2016    for mets?   • MASTECTOMY Left 2008   • PORTACATH PLACEMENT Right        PCP: Pt States, No Pcp    MEDICATIONS        Home Medications:  Prescriptions Prior to Admission   Medication Sig Dispense Refill Last Dose   • exemestane (AROMASIN) 25 mg chemo tablet TK 1 T PO D  5 5/27/2019 at 0900   • gabapentin (NEURONTIN) 300 mg capsule TK 1 C PO BID  0 5/27/2019 at 2000   • ibuprofen (MOTRIN) 200 mg tablet Take 200 mg by mouth every 6 (six) hours as needed for mild pain.   Past Week at Unknown time   • ondansetron (ZOFRAN) 8 mg tablet TK 1 T PO Q 8 H PRF NAUSEA  5 Past Month at Unknown time   • oxyCODONE (ROXICODONE) 5 mg immediate release tablet 5 mg every 8 (eight) hours as needed.    0 5/28/2019 at 0200   • sodium chloride 0.9 % parenteral solution 250 mL with ado-trastuzumab 100 mg recon  "soln Infuse into a venous catheter every 21 (twentyone) days.     Past Month at Unknown time   • leuprolide acetate (LUPRON DEPOT IM) Inject into the shoulder, thigh, or buttocks every 30 (thirty) days.   5/6/2019       Current inpatient medications were personally reviewed.    ALLERGIES        Morphine and Amoxicillin    FAMILY HISTORY        History reviewed. No pertinent family history.    SOCIAL HISTORY        Social History     Social History   • Marital status:      Spouse name: N/A   • Number of children: N/A   • Years of education: N/A     Social History Main Topics   • Smoking status: Never Smoker   • Smokeless tobacco: Never Used   • Alcohol use No   • Drug use: No   • Sexual activity: Not Asked     Other Topics Concern   • None     Social History Narrative   • None       REVIEW OF SYSTEMS        All other systems reviewed and negative except as noted in HPI    PHYSICAL EXAMINATION        /65 (BP Location: Right upper arm, Patient Position: Lying)   Pulse 99   Temp 36.8 °C (98.3 °F) (Oral)   Resp 18   Ht 1.651 m (5' 5\")   Wt 89.4 kg (197 lb)   SpO2 98%   BMI 32.78 kg/m²   Body mass index is 32.78 kg/m².    Intake/Output Summary (Last 24 hours) at 05/28/19 1819  Last data filed at 05/28/19 1629   Gross per 24 hour   Intake             3650 ml   Output             1890 ml   Net             1760 ml       Physical Exam   Gen: Obese female lying in bed, NAD  Vitals: reviewed, as above  HEENT: NCAT, PERRL, EOMI  L Breast: s/p reconstruction, scar  CV: RRR, +S1, +S2, no m/r/g appreciated  Pulm: CTA b/l  Abd: soft, obese, NT, ND, +bs  Ext: wwp, ?trace b/l LE edema, bandage R hip in T-shape, drain in place draining dark red blood.  Neuro: AAOx3, nonfocal, did not test R leg  Psych: pleasant, cooperative  Derm: no rashes  LABS / EKG        Labs  Post-op CBC reviewed, WBC 15.57, Hgb 11.1 stable from prior    ECG/Telemetry  Tele: NSR, rate 92    Imaging  R hip Xray: Status post total hip " arthroplasty/left pelvic sidewall cortical plate and screw fixation.    ASSESSMENT AND RECOMMENDATIONS           * Breast cancer metastasized to bone, left (CMS/HCC) (HCC)   Assessment & Plan    - outpatient f/u  - f/u pathology results     S/P total hip arthroplasty   Assessment & Plan    - severe left hip pain stemming from end stage OA of the hip with marked collapse and bone loss of the left femoral head.  - POD# 0 S/P L PALOMA with gap ring and constraint cup  - peggy-operative antibiotics, pain management, bowel regimen, and disposition per primary team  - recommend incentive spirometry to minimize post-operative pulmonary complications  - dvt prophylaxis per primary team     Leukemoid reaction   Assessment & Plan    - likely due to surgery - trend     Class 1 obesity due to excess calories without serious comorbidity with body mass index (BMI) of 32.0 to 32.9 in adult   Assessment & Plan    - dietary counseling, outpatient f/u     Iron deficiency anemia   Assessment & Plan    - trend with post-operative blood loss; transfuse > 7  - outpatient f/u, gets intermittent iron transfusions          Will sign off.  Please page [phone] with questions or concerns     Marianela Montalvo MD  5/28/2019

## (undated) DEVICE — GOWN SURGICAL MICROCOOL IMPERVIOUS XXL

## (undated) DEVICE — "***USE 138625*** 1 ETHIBOND GREEN 8X18"" CTX CR"

## (undated) DEVICE — STIRRUP STRAP DISPOSABLE

## (undated) DEVICE — DRESSING MEPILEX BORDER AG 4 X 12

## (undated) DEVICE — ***USE 136502*** APPLIER CLIP DISP MED/LRG MCA

## (undated) DEVICE — DRESSING TEGADERM 4X4 3/4

## (undated) DEVICE — SEALANT SURGICAL FLOSEAL 5ML STERILE PREP RECOTHERM

## (undated) DEVICE — MANIFOLD FOUR PORT NEPTUNE

## (undated) DEVICE — Device

## (undated) DEVICE — ***USE 138648*** SUTURE VICRYL 0 J534H OS-6 27IN UNDYED

## (undated) DEVICE — SPONGE LAP DISPOSABLE 18X18

## (undated) DEVICE — CONTAINER SPECIMEN STERILE 5OZ

## (undated) DEVICE — TIP SUCTION YANKAUER

## (undated) DEVICE — KIT CEMENT MIXING CARTRIDGE AND NOZZLE

## (undated) DEVICE — ***USE 56982*** SUTURE SILK 2-0  K833H

## (undated) DEVICE — APPLICATOR CHLORAPREP 26ML ORANGE TINT

## (undated) DEVICE — TUBE SUCTION 1/4INX20FT STERILE

## (undated) DEVICE — STAPLER SKIN

## (undated) DEVICE — ***USE 138652*** SUTURE VICRYL 1 J569H CPX 27IN UNDYED

## (undated) DEVICE — PAD GROUND ELECTROSURGICAL W/CORD

## (undated) DEVICE — ***USE 138531*** SUTURE VICRYL 2-0 J266H CP-1 UNDYED

## (undated) DEVICE — PILLOW ABDUCTION MEDIUM

## (undated) DEVICE — TOWEL DISPOSABLE OR

## (undated) DEVICE — SOLN IRRIG .9%SOD 1000ML

## (undated) DEVICE — SUTURE ETHILON 2-0   1674H

## (undated) DEVICE — DRAPE-U-1015

## (undated) DEVICE — BLADE RECIPROCATOR 277-096-325

## (undated) DEVICE — RESERVOIR 400CC  DRAIN JP 19FR KIT

## (undated) DEVICE — SOLN IRRIG .9%SOD 3L

## (undated) DEVICE — SPONGE DRESSING DRAIN STERILE EXCILON 2S